# Patient Record
Sex: MALE | Race: WHITE | Employment: FULL TIME | URBAN - METROPOLITAN AREA
[De-identification: names, ages, dates, MRNs, and addresses within clinical notes are randomized per-mention and may not be internally consistent; named-entity substitution may affect disease eponyms.]

---

## 2022-06-02 ENCOUNTER — HOSPITAL ENCOUNTER (OUTPATIENT)
Age: 52
Setting detail: OBSERVATION
Discharge: HOME | End: 2022-06-04
Attending: EMERGENCY MEDICINE | Admitting: HOSPITALIST
Payer: COMMERCIAL

## 2022-06-02 DIAGNOSIS — R07.9 CHEST PAIN, UNSPECIFIED TYPE: Primary | ICD-10-CM

## 2022-06-02 PROCEDURE — 99285 EMERGENCY DEPT VISIT HI MDM: CPT

## 2022-06-02 RX ORDER — LOSARTAN POTASSIUM 100 MG/1
100 TABLET ORAL DAILY
COMMUNITY
End: 2022-07-22 | Stop reason: SDUPTHER

## 2022-06-02 RX ORDER — DOXYCYCLINE HYCLATE 100 MG/1
100 CAPSULE ORAL 2 TIMES DAILY
COMMUNITY

## 2022-06-02 RX ORDER — TRIAMTERENE AND HYDROCHLOROTHIAZIDE 37.5; 25 MG/1; MG/1
1 TABLET ORAL DAILY
Status: ON HOLD | COMMUNITY
End: 2022-06-04 | Stop reason: HOSPADM

## 2022-06-02 ASSESSMENT — PAIN SCALES - GENERAL: PAINLEVEL_OUTOF10: 3

## 2022-06-02 ASSESSMENT — PAIN - FUNCTIONAL ASSESSMENT: PAIN_FUNCTIONAL_ASSESSMENT: 0-10

## 2022-06-03 ENCOUNTER — APPOINTMENT (OUTPATIENT)
Dept: GENERAL RADIOLOGY | Age: 52
End: 2022-06-03
Payer: COMMERCIAL

## 2022-06-03 ENCOUNTER — APPOINTMENT (OUTPATIENT)
Dept: CT IMAGING | Age: 52
End: 2022-06-03
Payer: COMMERCIAL

## 2022-06-03 PROBLEM — R07.9 CHEST PAIN: Status: ACTIVE | Noted: 2022-06-03

## 2022-06-03 LAB
A/G RATIO: 2.1 (ref 1.1–2.2)
ALBUMIN SERPL-MCNC: 5 G/DL (ref 3.4–5)
ALP BLD-CCNC: 64 U/L (ref 40–129)
ALT SERPL-CCNC: 46 U/L (ref 10–40)
ANION GAP SERPL CALCULATED.3IONS-SCNC: 10 MMOL/L (ref 3–16)
AST SERPL-CCNC: 38 U/L (ref 15–37)
BASOPHILS ABSOLUTE: 0 K/UL (ref 0–0.2)
BASOPHILS RELATIVE PERCENT: 0.7 %
BILIRUB SERPL-MCNC: 0.5 MG/DL (ref 0–1)
BUN BLDV-MCNC: 15 MG/DL (ref 7–20)
CALCIUM SERPL-MCNC: 11.1 MG/DL (ref 8.3–10.6)
CHLORIDE BLD-SCNC: 98 MMOL/L (ref 99–110)
CO2: 28 MMOL/L (ref 21–32)
CREAT SERPL-MCNC: 0.8 MG/DL (ref 0.9–1.3)
EKG ATRIAL RATE: 71 BPM
EKG DIAGNOSIS: NORMAL
EKG P AXIS: 38 DEGREES
EKG P-R INTERVAL: 162 MS
EKG Q-T INTERVAL: 392 MS
EKG QRS DURATION: 90 MS
EKG QTC CALCULATION (BAZETT): 425 MS
EKG R AXIS: 62 DEGREES
EKG T AXIS: 36 DEGREES
EKG VENTRICULAR RATE: 71 BPM
EOSINOPHILS ABSOLUTE: 0.1 K/UL (ref 0–0.6)
EOSINOPHILS RELATIVE PERCENT: 1.4 %
GFR AFRICAN AMERICAN: >60
GFR NON-AFRICAN AMERICAN: >60
GLUCOSE BLD-MCNC: 134 MG/DL (ref 70–99)
HCT VFR BLD CALC: 45.6 % (ref 40.5–52.5)
HEMOGLOBIN: 15.7 G/DL (ref 13.5–17.5)
LEFT VENTRICULAR EJECTION FRACTION MODE: NORMAL
LV EF: 63 %
LV EF: 65 %
LVEF MODALITY: NORMAL
LYMPHOCYTES ABSOLUTE: 2.1 K/UL (ref 1–5.1)
LYMPHOCYTES RELATIVE PERCENT: 38.6 %
MAGNESIUM: 2.2 MG/DL (ref 1.8–2.4)
MCH RBC QN AUTO: 31.6 PG (ref 26–34)
MCHC RBC AUTO-ENTMCNC: 34.4 G/DL (ref 31–36)
MCV RBC AUTO: 91.7 FL (ref 80–100)
MONOCYTES ABSOLUTE: 0.6 K/UL (ref 0–1.3)
MONOCYTES RELATIVE PERCENT: 10.3 %
NEUTROPHILS ABSOLUTE: 2.7 K/UL (ref 1.7–7.7)
NEUTROPHILS RELATIVE PERCENT: 49 %
PARATHYROID HORMONE INTACT: 78.7 PG/ML (ref 14–72)
PDW BLD-RTO: 12.7 % (ref 12.4–15.4)
PLATELET # BLD: 228 K/UL (ref 135–450)
PMV BLD AUTO: 7.6 FL (ref 5–10.5)
POC ACT LR: 235 SEC
POTASSIUM REFLEX MAGNESIUM: 4.1 MMOL/L (ref 3.5–5.1)
RBC # BLD: 4.97 M/UL (ref 4.2–5.9)
SODIUM BLD-SCNC: 136 MMOL/L (ref 136–145)
TOTAL PROTEIN: 7.4 G/DL (ref 6.4–8.2)
TROPONIN: <0.01 NG/ML
TSH REFLEX FT4: 1 UIU/ML (ref 0.27–4.2)
VITAMIN D 25-HYDROXY: 27.4 NG/ML
WBC # BLD: 5.5 K/UL (ref 4–11)

## 2022-06-03 PROCEDURE — 2500000003 HC RX 250 WO HCPCS

## 2022-06-03 PROCEDURE — 93005 ELECTROCARDIOGRAM TRACING: CPT | Performed by: EMERGENCY MEDICINE

## 2022-06-03 PROCEDURE — 93017 CV STRESS TEST TRACING ONLY: CPT | Performed by: INTERNAL MEDICINE

## 2022-06-03 PROCEDURE — A9502 TC99M TETROFOSMIN: HCPCS | Performed by: NURSE PRACTITIONER

## 2022-06-03 PROCEDURE — 36415 COLL VENOUS BLD VENIPUNCTURE: CPT

## 2022-06-03 PROCEDURE — 2580000003 HC RX 258: Performed by: HOSPITALIST

## 2022-06-03 PROCEDURE — 2580000003 HC RX 258

## 2022-06-03 PROCEDURE — 92928 PRQ TCAT PLMT NTRAC ST 1 LES: CPT | Performed by: INTERNAL MEDICINE

## 2022-06-03 PROCEDURE — 6370000000 HC RX 637 (ALT 250 FOR IP): Performed by: INTERNAL MEDICINE

## 2022-06-03 PROCEDURE — 93010 ELECTROCARDIOGRAM REPORT: CPT | Performed by: INTERNAL MEDICINE

## 2022-06-03 PROCEDURE — 99205 OFFICE O/P NEW HI 60 MIN: CPT | Performed by: INTERNAL MEDICINE

## 2022-06-03 PROCEDURE — 93571 IV DOP VEL&/PRESS C FLO 1ST: CPT | Performed by: INTERNAL MEDICINE

## 2022-06-03 PROCEDURE — C1874 STENT, COATED/COV W/DEL SYS: HCPCS

## 2022-06-03 PROCEDURE — 80053 COMPREHEN METABOLIC PANEL: CPT

## 2022-06-03 PROCEDURE — 84443 ASSAY THYROID STIM HORMONE: CPT

## 2022-06-03 PROCEDURE — 75574 CT ANGIO HRT W/3D IMAGE: CPT

## 2022-06-03 PROCEDURE — 2500000003 HC RX 250 WO HCPCS: Performed by: INTERNAL MEDICINE

## 2022-06-03 PROCEDURE — 3430000000 HC RX DIAGNOSTIC RADIOPHARMACEUTICAL: Performed by: NURSE PRACTITIONER

## 2022-06-03 PROCEDURE — 6370000000 HC RX 637 (ALT 250 FOR IP): Performed by: HOSPITALIST

## 2022-06-03 PROCEDURE — 99153 MOD SED SAME PHYS/QHP EA: CPT

## 2022-06-03 PROCEDURE — C9600 PERC DRUG-EL COR STENT SING: HCPCS

## 2022-06-03 PROCEDURE — 6370000000 HC RX 637 (ALT 250 FOR IP): Performed by: EMERGENCY MEDICINE

## 2022-06-03 PROCEDURE — 99152 MOD SED SAME PHYS/QHP 5/>YRS: CPT

## 2022-06-03 PROCEDURE — 99152 MOD SED SAME PHYS/QHP 5/>YRS: CPT | Performed by: INTERNAL MEDICINE

## 2022-06-03 PROCEDURE — 82306 VITAMIN D 25 HYDROXY: CPT

## 2022-06-03 PROCEDURE — 78452 HT MUSCLE IMAGE SPECT MULT: CPT | Performed by: INTERNAL MEDICINE

## 2022-06-03 PROCEDURE — 93458 L HRT ARTERY/VENTRICLE ANGIO: CPT

## 2022-06-03 PROCEDURE — C1769 GUIDE WIRE: HCPCS

## 2022-06-03 PROCEDURE — 6360000002 HC RX W HCPCS

## 2022-06-03 PROCEDURE — 83735 ASSAY OF MAGNESIUM: CPT

## 2022-06-03 PROCEDURE — C1725 CATH, TRANSLUMIN NON-LASER: HCPCS

## 2022-06-03 PROCEDURE — 6360000004 HC RX CONTRAST MEDICATION: Performed by: INTERNAL MEDICINE

## 2022-06-03 PROCEDURE — 93005 ELECTROCARDIOGRAM TRACING: CPT | Performed by: INTERNAL MEDICINE

## 2022-06-03 PROCEDURE — G0378 HOSPITAL OBSERVATION PER HR: HCPCS

## 2022-06-03 PROCEDURE — C1894 INTRO/SHEATH, NON-LASER: HCPCS

## 2022-06-03 PROCEDURE — 84484 ASSAY OF TROPONIN QUANT: CPT

## 2022-06-03 PROCEDURE — C1887 CATHETER, GUIDING: HCPCS

## 2022-06-03 PROCEDURE — 85347 COAGULATION TIME ACTIVATED: CPT

## 2022-06-03 PROCEDURE — 85025 COMPLETE CBC W/AUTO DIFF WBC: CPT

## 2022-06-03 PROCEDURE — 96374 THER/PROPH/DIAG INJ IV PUSH: CPT

## 2022-06-03 PROCEDURE — 6370000000 HC RX 637 (ALT 250 FOR IP)

## 2022-06-03 PROCEDURE — 83970 ASSAY OF PARATHORMONE: CPT

## 2022-06-03 PROCEDURE — 2709999900 HC NON-CHARGEABLE SUPPLY

## 2022-06-03 PROCEDURE — 71045 X-RAY EXAM CHEST 1 VIEW: CPT

## 2022-06-03 PROCEDURE — 93458 L HRT ARTERY/VENTRICLE ANGIO: CPT | Performed by: INTERNAL MEDICINE

## 2022-06-03 PROCEDURE — 93571 IV DOP VEL&/PRESS C FLO 1ST: CPT

## 2022-06-03 RX ORDER — ONDANSETRON 4 MG/1
4 TABLET, ORALLY DISINTEGRATING ORAL EVERY 8 HOURS PRN
Status: DISCONTINUED | OUTPATIENT
Start: 2022-06-03 | End: 2022-06-04 | Stop reason: HOSPADM

## 2022-06-03 RX ORDER — ASPIRIN 81 MG/1
324 TABLET, CHEWABLE ORAL ONCE
Status: COMPLETED | OUTPATIENT
Start: 2022-06-03 | End: 2022-06-03

## 2022-06-03 RX ORDER — POLYETHYLENE GLYCOL 3350 17 G/17G
17 POWDER, FOR SOLUTION ORAL DAILY PRN
Status: DISCONTINUED | OUTPATIENT
Start: 2022-06-03 | End: 2022-06-04 | Stop reason: HOSPADM

## 2022-06-03 RX ORDER — METOPROLOL TARTRATE 5 MG/5ML
5 INJECTION INTRAVENOUS EVERY 5 MIN PRN
Status: DISCONTINUED | OUTPATIENT
Start: 2022-06-03 | End: 2022-06-04 | Stop reason: HOSPADM

## 2022-06-03 RX ORDER — ACETAMINOPHEN 650 MG/1
650 SUPPOSITORY RECTAL EVERY 6 HOURS PRN
Status: DISCONTINUED | OUTPATIENT
Start: 2022-06-03 | End: 2022-06-04 | Stop reason: HOSPADM

## 2022-06-03 RX ORDER — SODIUM CHLORIDE 9 MG/ML
INJECTION, SOLUTION INTRAVENOUS PRN
Status: DISCONTINUED | OUTPATIENT
Start: 2022-06-03 | End: 2022-06-04 | Stop reason: HOSPADM

## 2022-06-03 RX ORDER — MAGNESIUM HYDROXIDE/ALUMINUM HYDROXICE/SIMETHICONE 120; 1200; 1200 MG/30ML; MG/30ML; MG/30ML
30 SUSPENSION ORAL EVERY 6 HOURS PRN
Status: DISCONTINUED | OUTPATIENT
Start: 2022-06-03 | End: 2022-06-04 | Stop reason: HOSPADM

## 2022-06-03 RX ORDER — ROSUVASTATIN CALCIUM 20 MG/1
20 TABLET, COATED ORAL DAILY
Status: DISCONTINUED | OUTPATIENT
Start: 2022-06-03 | End: 2022-06-04 | Stop reason: HOSPADM

## 2022-06-03 RX ORDER — ACETAMINOPHEN 325 MG/1
650 TABLET ORAL EVERY 6 HOURS PRN
Status: DISCONTINUED | OUTPATIENT
Start: 2022-06-03 | End: 2022-06-04 | Stop reason: HOSPADM

## 2022-06-03 RX ORDER — NITROGLYCERIN 0.4 MG/1
0.4 TABLET SUBLINGUAL EVERY 5 MIN PRN
Status: DISCONTINUED | OUTPATIENT
Start: 2022-06-03 | End: 2022-06-04 | Stop reason: HOSPADM

## 2022-06-03 RX ORDER — CARVEDILOL 6.25 MG/1
6.25 TABLET ORAL 2 TIMES DAILY WITH MEALS
Status: DISCONTINUED | OUTPATIENT
Start: 2022-06-03 | End: 2022-06-03

## 2022-06-03 RX ORDER — EPTIFIBATIDE 0.75 MG/ML
2 INJECTION, SOLUTION INTRAVENOUS CONTINUOUS
Status: ACTIVE | OUTPATIENT
Start: 2022-06-03 | End: 2022-06-03

## 2022-06-03 RX ORDER — SODIUM CHLORIDE 0.9 % (FLUSH) 0.9 %
5-40 SYRINGE (ML) INJECTION PRN
Status: DISCONTINUED | OUTPATIENT
Start: 2022-06-03 | End: 2022-06-04 | Stop reason: HOSPADM

## 2022-06-03 RX ORDER — SODIUM CHLORIDE 9 MG/ML
INJECTION, SOLUTION INTRAVENOUS CONTINUOUS
Status: DISCONTINUED | OUTPATIENT
Start: 2022-06-03 | End: 2022-06-04 | Stop reason: HOSPADM

## 2022-06-03 RX ORDER — ENOXAPARIN SODIUM 100 MG/ML
40 INJECTION SUBCUTANEOUS DAILY
Status: DISCONTINUED | OUTPATIENT
Start: 2022-06-03 | End: 2022-06-04 | Stop reason: HOSPADM

## 2022-06-03 RX ORDER — CARVEDILOL 6.25 MG/1
12.5 TABLET ORAL 2 TIMES DAILY WITH MEALS
Status: DISCONTINUED | OUTPATIENT
Start: 2022-06-03 | End: 2022-06-04 | Stop reason: HOSPADM

## 2022-06-03 RX ORDER — LOSARTAN POTASSIUM 100 MG/1
100 TABLET ORAL DAILY
Status: DISCONTINUED | OUTPATIENT
Start: 2022-06-03 | End: 2022-06-04 | Stop reason: HOSPADM

## 2022-06-03 RX ORDER — ONDANSETRON 2 MG/ML
4 INJECTION INTRAMUSCULAR; INTRAVENOUS EVERY 6 HOURS PRN
Status: DISCONTINUED | OUTPATIENT
Start: 2022-06-03 | End: 2022-06-04 | Stop reason: HOSPADM

## 2022-06-03 RX ORDER — CLOPIDOGREL BISULFATE 75 MG/1
75 TABLET ORAL DAILY
Status: DISCONTINUED | OUTPATIENT
Start: 2022-06-03 | End: 2022-06-04 | Stop reason: HOSPADM

## 2022-06-03 RX ORDER — ASPIRIN 81 MG/1
81 TABLET, CHEWABLE ORAL DAILY
Status: DISCONTINUED | OUTPATIENT
Start: 2022-06-04 | End: 2022-06-04 | Stop reason: HOSPADM

## 2022-06-03 RX ORDER — TRIAMTERENE AND HYDROCHLOROTHIAZIDE 37.5; 25 MG/1; MG/1
1 TABLET ORAL DAILY
Status: DISCONTINUED | OUTPATIENT
Start: 2022-06-03 | End: 2022-06-03

## 2022-06-03 RX ORDER — SODIUM CHLORIDE 0.9 % (FLUSH) 0.9 %
5-40 SYRINGE (ML) INJECTION EVERY 12 HOURS SCHEDULED
Status: DISCONTINUED | OUTPATIENT
Start: 2022-06-03 | End: 2022-06-04 | Stop reason: HOSPADM

## 2022-06-03 RX ADMIN — ASPIRIN 324 MG: 81 TABLET, CHEWABLE ORAL at 01:17

## 2022-06-03 RX ADMIN — SODIUM CHLORIDE: 9 INJECTION, SOLUTION INTRAVENOUS at 18:08

## 2022-06-03 RX ADMIN — LOSARTAN POTASSIUM 100 MG: 100 TABLET, FILM COATED ORAL at 11:33

## 2022-06-03 RX ADMIN — CLOPIDOGREL BISULFATE 75 MG: 75 TABLET ORAL at 20:06

## 2022-06-03 RX ADMIN — TETROFOSMIN 10 MILLICURIE: 1.38 INJECTION, POWDER, LYOPHILIZED, FOR SOLUTION INTRAVENOUS at 07:40

## 2022-06-03 RX ADMIN — Medication 10 ML: at 11:34

## 2022-06-03 RX ADMIN — METOPROLOL TARTRATE 5 MG: 1 INJECTION, SOLUTION INTRAVENOUS at 14:15

## 2022-06-03 RX ADMIN — CARVEDILOL 12.5 MG: 6.25 TABLET, FILM COATED ORAL at 20:05

## 2022-06-03 RX ADMIN — IOPAMIDOL 100 ML: 755 INJECTION, SOLUTION INTRAVENOUS at 14:32

## 2022-06-03 RX ADMIN — TETROFOSMIN 30 MILLICURIE: 1.38 INJECTION, POWDER, LYOPHILIZED, FOR SOLUTION INTRAVENOUS at 09:28

## 2022-06-03 RX ADMIN — ROSUVASTATIN 20 MG: 20 TABLET, FILM COATED ORAL at 11:33

## 2022-06-03 RX ADMIN — SODIUM CHLORIDE: 9 INJECTION, SOLUTION INTRAVENOUS at 18:23

## 2022-06-03 RX ADMIN — IOPAMIDOL 99 ML: 755 INJECTION, SOLUTION INTRAVENOUS at 17:33

## 2022-06-03 ASSESSMENT — PAIN SCALES - GENERAL
PAINLEVEL_OUTOF10: 0

## 2022-06-03 ASSESSMENT — HEART SCORE: ECG: 0

## 2022-06-03 NOTE — PRE SEDATION
Brief Pre-Op Note/Sedation Assessment      Cherelle Peter  1970  [id number]  3:30 PM    Planned Procedure: Cardiac Catheterization Procedure  Post Procedure Plan: Return to same level of care  Consent: I have discussed with the patient and/or the patient representative the indication, alternatives, and the possible risks and/or complications of the planned procedure and the anesthesia methods. The patient and/or patient representative appear to understand and agree to proceed. Chief Complaint:   Chest Pain/Pressure  Anginal Equivalent  Dyspnea on Exertion  Dyspnea    Indications for Cath Procedure:   Presentation:  New Onset Angina <= 2 months, Worsening Angina and Suspected CAD   Anginal Classification within 2 weeks:  CCS III - Symptoms with everyday living activities, i.e., moderate limitation   Angina Symptoms Assessment:  Typical Chest Pain   Heart Failure Class within last 2 weeks:  No symptoms   Cardiovascular Instability:  Yes    Prior Ischemic Workup/Eval:   Pre-Procedural Medications: Yes: ACE/ARB/ARNI, Aspirin, Beta Blockers and STATIN   Stress Test Completed? Yes:  Stress or Imaging Studies Performed (within ANY time period):   Type:  Stress Nuclear  Results:  Positive:  Myocardial Perfusion Defects (Nuclear) Extent of Ischemia:  Intermediate    Does Patient need surgery?  Cath Valve Surgery:  No    Pre-Procedure Medical History:   Vital Signs:  /71   Pulse 75   Temp 98.8 °F (37.1 °C) (Oral)   Resp 17   Ht 5' 8\" (1.727 m)   Wt 246 lb 3.2 oz (111.7 kg)   SpO2 96%   BMI 37.43 kg/m²    Allergies:  Reviewed in EMR   Medications:  Reviewed in EMR   Past Medical History:  Reviewed in EMR   Surgical History:  Reviewed in EMR    Pre-Sedation:   Pre-Sedation Documentation and Exam = I have assessed the patient and reviewed the H&P on the chart.    Prior History of Anesthesia Complications = none   Modified Mallampati = II (soft palate, uvula, fauces visible)   ASA Classification = Class 2 - A normal healthy patient with mild systemic disease    Greg Scale:   Activity:  2 - Able to move 4 extremities voluntarily on command   Respiration:  2 - Able to breathe deeply and cough freely   Circulation:  2 - BP+/- 20mmHg of normal   Consciousness:  2 - Fully awake   Oxygen Saturation (color):  2 - Able to maintain oxygen saturation >92% on room air    Sedation/Anesthesia Plan:  Guard the patient's safety and welfare. Minimize physical discomfort and pain. Minimize negative psychological responses to treatment by providing sedation and analgesia and maximize the potential amnesia. Patient to meet pre-procedure discharge plan.     Medication Planned:  Midazolam intravenously and fentanyl intravenously    Patient is an appropriate candidate for plan of sedation:   Yes      Electronically signed by Bong Sorensen MD on 6/3/2022 at 3:30 PM

## 2022-06-03 NOTE — CONSULTS
[facility]  [phone]      Chief Complaint   Patient presents with    Chest Pain     pt via EMS from home. Pt c/o burning in his chest, relieved by rolaid at 8. Has rash to back of neck, was given antibiotics that he did not finish. Reason for consult:  Abnormal stress test    ASSESSMENT AND PLAN:    1. Chest pain of uncertain etiology  2. Abnormal nuclear perfusion on stress test with normal ECG and normal exercise capacity  3. Hypercholesterolemia  4. 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 Hypertension    Plan  1. Although his stress test is technically abnormal, given how mild his symptoms are now, and how he had no symptoms on treadmill testing with normal exercise tolerance, there is a good chance that either the stress test is a false-positive or the patient has low-risk coronary disease    2. Will do coronary CTA to r/o left main or proximal LAD disease    3. If CTA shows no coronary disease patient can go home    4. If there is significant CAD that may require stenting or CABG will reassess patient and potentially keep for cath Monday - however most likely will be able to discharge and arrange urgent outpatient f/u    5. I put in metoprolol IV orders for HR control for CT    6. Will low-dose carvedilol for HR and BP control    7. Nephrology consult for management of hypercalcemia    Further recs pending results of coronary CTA    History of Present Illness:  Sha Glez is a 47 y.o. patient who presented to the hospital with complaints of chest pain and exertional dyspnea. I have been asked to provide consultation regarding further management and testing. Patient is a previously healthy 47year old with no chronic health problems other than mild hypercalcemia from a small parathytoid adenoma, who presents with about a week of dyspnea on exertion while mowing the grass.   He didn't think much of his symptoms until last night when he suddenly woke from sleeping with severe chest pain. He eats a lot of spicy food. He took Rolaids, and the pain abated, but came back about 30 minutes later so he came to ED. He has well-treated hypercholesterolemia and HTN and otherwise no known CAD risk factors. Lifelong nonsmoker/nondrinker. No diabetes. No concerning FH of premature CAD. He has been on Crestor for a long time. Past Medical History:   has a past medical history of Hypertension. Surgical History:   has no past surgical history on file. Social History:   reports that he has never smoked. He has never used smokeless tobacco.     Family History:  No family history of premature coronary artery disease, aortic disease, or valve disease. Home Medications:  Were reviewed and are listed in nursing record. and/or listed below  Prior to Admission medications    Medication Sig Start Date End Date Taking?  Authorizing Provider   losartan (COZAAR) 100 MG tablet Take 100 mg by mouth daily   Yes Historical Provider, MD   triamterene-hydroCHLOROthiazide (MAXZIDE-25) 37.5-25 MG per tablet Take 1 tablet by mouth daily   Yes Historical Provider, MD   Rosuvastatin Calcium 20 MG CPSP Take 20 mg by mouth daily   Yes Historical Provider, MD   doxycycline hyclate (VIBRAMYCIN) 100 mg capsule Take 100 mg by mouth 2 times daily   Yes Historical Provider, MD        Current Medications:  Current Facility-Administered Medications   Medication Dose Route Frequency Provider Last Rate Last Admin    losartan (COZAAR) tablet 100 mg  100 mg Oral Daily Anselmo Beth MD   100 mg at 06/03/22 1133    rosuvastatin (CRESTOR) tablet 20 mg  20 mg Oral Daily Anselmo Beth MD   20 mg at 06/03/22 1133    triamterene-hydroCHLOROthiazide (MAXZIDE-25) 37.5-25 MG per tablet 1 tablet  1 tablet Oral Daily Anselmo Beth MD        sodium chloride flush 0.9 % injection 5-40 mL  5-40 mL IntraVENous 2 times per day Wade Lazo MD   10 mL at 06/03/22 1134    sodium chloride flush 0.9 % injection 5-40 mL  5-40 mL IntraVENous PRN Anselmo Beth MD        0.9 % sodium chloride infusion   IntraVENous PRN Jeison Johnson MD        ondansetron (ZOFRAN-ODT) disintegrating tablet 4 mg  4 mg Oral Q8H PRN Jeison Johnson MD        Or    ondansetron (ZOFRAN) injection 4 mg  4 mg IntraVENous Q6H PRN Jeison Johnson MD        acetaminophen (TYLENOL) tablet 650 mg  650 mg Oral Q6H PRN Jeison Johnson MD        Or    acetaminophen (TYLENOL) suppository 650 mg  650 mg Rectal Q6H PRN Jeison Johnson MD        polyethylene glycol (GLYCOLAX) packet 17 g  17 g Oral Daily PRN Jeison Johnson MD        [START ON 6/4/2022] aspirin chewable tablet 81 mg  81 mg Oral Daily Anselmo Beth MD        aluminum & magnesium hydroxide-simethicone (MAALOX) 200-200-20 MG/5ML suspension 30 mL  30 mL Oral Q6H PRN Anselmo Beth MD        enoxaparin (LOVENOX) injection 40 mg  40 mg SubCUTAneous Daily Anselmo Beth MD        nitroGLYCERIN (NITROSTAT) SL tablet 0.4 mg  0.4 mg SubLINGual Q5 Min PRN Anselmo Beth MD        0.9 % sodium chloride infusion   IntraVENous Continuous Jeison Johnson MD            Allergies:  Patient has no known allergies. Review of Systems:     All systems reviewed and negative except as stated above. Physical Examination:    Vitals:    06/03/22 0504   BP: (!) 147/91   Pulse: 67   Resp: 17   Temp: 98 °F (36.7 °C)   SpO2: 97%    Weight: 246 lb 3.2 oz (111.7 kg)       Body mass index is 37.43 kg/m².     General Appearance:  Alert, cooperative, no distress, appears stated age   Head:  Normocephalic, without obvious abnormality, atraumatic   Eyes:  PERRL, conjunctiva/corneas clear   Nose: Nares normal, no drainage or sinus tenderness   Throat: Lips, mucosa, and tongue normal   Neck: Supple, symmetrical, trachea midline, no adenopathy, thyroid: not enlarged, symmetric, no tenderness/mass/nodules, no carotid bruit or JVD   Lungs:   Clear to auscultation bilaterally, respirations unlabored   Chest Wall:  No tenderness or deformity   Heart:  Regular rate and rhythm, S1, S2 normal, no murmur, rub or gallop   Abdomen:   Soft, non-tender, bowel sounds active all four quadrants,  no masses, no organomegaly   Extremities: Extremities normal, atraumatic, no cyanosis or edema   Pulses: 2+ and symmetric   Skin: Skin color, texture, turgor normal, no rashes or lesions   Psych: Normal mood and affect   Neurologic: CN II-XII grossly intact        Labs  No results found for: PROBNP      No results found for: CHOL, TRIG, HDL, LDLCALC, LABVLDL      Troponin   Date/Time Value Ref Range Status   06/03/2022 10:34 AM <0.01 <0.01 ng/mL Final     Comment:     Methodology by Troponin T   06/03/2022 05:13 AM <0.01 <0.01 ng/mL Final     Comment:     Methodology by Troponin T   06/03/2022 12:22 AM <0.01 <0.01 ng/mL Final     Comment:     Methodology by Troponin T           CBC:   Lab Results   Component Value Date    WBC 5.5 06/03/2022    RBC 4.97 06/03/2022    HGB 15.7 06/03/2022    HCT 45.6 06/03/2022    MCV 91.7 06/03/2022    RDW 12.7 06/03/2022     06/03/2022     CMP:    Lab Results   Component Value Date     06/03/2022    K 4.1 06/03/2022    CL 98 06/03/2022    CO2 28 06/03/2022    BUN 15 06/03/2022    CREATININE 0.8 06/03/2022    GFRAA >60 06/03/2022    AGRATIO 2.1 06/03/2022    LABGLOM >60 06/03/2022    GLUCOSE 134 06/03/2022    PROT 7.4 06/03/2022    CALCIUM 11.1 06/03/2022    BILITOT 0.5 06/03/2022    ALKPHOS 64 06/03/2022    AST 38 06/03/2022    ALT 46 06/03/2022     PT/INR:  No results found for: PTINR  Lab Results   Component Value Date    TROPONINI <0.01 06/03/2022       EKG:  I have reviewed EKG with the following interpretation:  Impression:    Normal sinus rhythm  Normal ECG    CXR - clear lungs    Echo: none prior    Stress: There is a small-moderate inferior lateral reversible defect c/w ischemia.   LV function is normal with no regional abnormalities and EF=63%  Intermediate risk study. Cath: no prior    Old notes reviewed  Telemetry reviewd  Ekg personally reviewed  Chest xray personally reviewed  Echo, stress, cath, and/or other cardiac testing reviewed in detail   Medications and labs reviewed    High complexity/medical decision making due to extensive data review, extensive history review, independent review of data    High risk due to acute illness, evaluation of drug-drug interactions, medication management and diagnostic interventions    I will address the patient's cardiac risk factors and adjusted pharmacologic treatment as needed. In addition, I have reinforced the need for patient directed risk factor modification. Tobacco use was discussed with the patient and educated on the negative effects. I have asked the patient to not utilize these agents. Thank you for allowing to us to participate in the care or Sivakumar Lawson. Further evaluation will be based upon the patient's clinical course and testing results. All questions and concerns were addressed to the patient/family. Alternatives to my treatment were discussed. The note was completed using EMR. Every effort was made to ensure accuracy; however, inadvertent computerized transcription errors may be present.       Guillermo Lynch MD, MD 6/3/2022 11:50 AM

## 2022-06-03 NOTE — H&P
_    [facility] HOSPITALIST HISTORY AND PHYSICAL/CONSULT    6/3/2022 2:26 AM    Patient Information:  Brayan aDwn is a 47 y.o. male [id number]    PCP:  No primary care provider on file. (Tel: None )    Date of Service:   Pt seen/examined on 6/3/2022    Placed in Observation. Chief complaint:    Chief Complaint   Patient presents with    Chest Pain     pt via EMS from home. Pt c/o burning in his chest, relieved by rolaid at 8. Has rash to back of neck, was given antibiotics that he did not finish. History of Present Illness:  Cherelle Peter is a 47 y.o. male who presented with   ·  he comes from home . He came home from work in the evening . Since then he felt some substernal and left sided chest pain , not related to exertion or so . He ate his dinner and took some Rolaid @ Home thinking it was 2/2 heartburn or so . His chest pain s/s did zahraa for the time being . He went to bed . Around [id number] , he started having recurrence of his chest pains and left sided chest discomfort . He could not get comfortable lying in bed . Hence he called 911 and was BIBA to ED . His s/s did improve en route to ED   · Of note , he also took some Doxycycline X 5-7 days @ Home for an insect bite that happened in his posterior neck region     History and pertinent information obtained from   · ED provider   · Prior Chart    · Patient        Vitals:    06/02/22 2353 06/02/22 2355   BP:  (!) 153/96   Pulse: 81    Temp: 98.5 °F (36.9 °C)    TempSrc: Oral    Weight: 245 lb (111.1 kg)    Height: 5' 8\" (1.727 m)           · Imaging/Labs/Work up/Medications given/Consults called by ED => Reviewed and Noted        No current facility-administered medications for this encounter. On my evaluation, patient's condition as below :   · Since arrival to ED, post above Rx, patient is AO X 3   · Now chest pain free   · No SOB   · No N/V   ·        REVIEW OF SYSTEMS:      Pertinent positives and negatives are noted in the HPI . All other systems were reviewed and are negative. Past Medical History:         has a past medical history of Hypertension. Past Surgical History:         has no past surgical history on file. Medications:    Current Outpatient Medications on File Prior to Encounter   Medication Sig Dispense Refill    losartan (COZAAR) 100 MG tablet Take 100 mg by mouth daily      triamterene-hydroCHLOROthiazide (MAXZIDE-25) 37.5-25 MG per tablet Take 1 tablet by mouth daily      Rosuvastatin Calcium 20 MG CPSP Take 20 mg by mouth daily      doxycycline hyclate (VIBRAMYCIN) 100 mg capsule Take 100 mg by mouth 2 times daily           Allergies:  Not on File       Social History:          Family History:      Father : CVA ; AFIB , dementia       Physical Exam:  BP (!) 153/96   Pulse 81   Temp 98.5 °F (36.9 °C) (Oral)   Ht 5' 8\" (1.727 m)   Wt 245 lb (111.1 kg)   BMI 37.25 kg/m²     General appearance:  Appears comfortable. Well nourished   Eyes:  Sclera clear, pupils equal  ENT:  Moist mucus membranes, Trachea midline. Cardiovascular: Regular rhythm, normal S1, S2. No edema in lower extremities   Respiratory:   Noted Clear to auscultation bilaterally,  No wheeze, good inspiratory effort   Gastrointestinal:  Abdomen soft,  non-tender,  not distended,   Musculoskeletal:  No cyanosis in digits, neck supple  Neurology:  Cranial nerves grossly intact. Alert and oriented in time, place and person. No speech or motor deficits   Psychiatry:  Appropriate affect. Not agitated  Skin:  Warm, dry, normal turgor, no rash       Labs:     Recent Labs     06/03/22 0022   WBC 5.5   HGB 15.7   HCT 45.6        Recent Labs     06/03/22 0022      K 4.1   CL 98*   CO2 28   BUN 15   CREATININE 0.8*   CALCIUM 11.1*     Recent Labs     06/03/22 0022   AST 38*   ALT 46*   BILITOT 0.5   ALKPHOS 64     No results for input(s): INR in the last 72 hours.   Recent Labs     06/03/22 0022   TROPONINI <0.01 Urinalysis:    No results found for: [place] er, BACTERIA, [address] [facility], [place], [address], [place] [address]      Radiology:     CXR:   I have reviewed the CXR  No acute findings or is unremarkable for any acute pathology needing acute interventions, on review. EKG/Telemonitor :    I have reviewed the EKG  NSR     IMAGING :     XR CHEST PORTABLE   Final Result   No acute cardiopulmonary process. PROBLEM LIST [ ACTIVE + CHRONIC ]     Active Hospital Problems    Diagnosis Date Noted    Chest pain [R07.9] 06/03/2022     Priority: Medium         ACUTE/CHRONIC ISSUES      Chest pain and SOB , r/o ACS , POA    Mild hypercalcemia POA      HTN Hx    HLD Hx     PERTINENT PLAN OF CARE       Will continue cardiac monitor while inpatient. Admission EKG reviewed . EKG prn chest pain. Cardiac enzymes on admission in ED noted per Labs and will trend cardiac enzymes further while inpatient. => Medication Plan for now will be as follows . Started ASA QD  + NTG Prn  .    Planned NM stress test in AM w/ Exercise    Resumed his home medications    Gentle IVF . Checks TFTs, PTH, Vit D levels for Hypercalcemia evaluation       · Medication received in ED and workup in ED so far Noted and reviewed as above. · Home medications for chronic medical problems Reviewed and Held/Resumed/Changes made, as clinically warranted/Indicated. · Please See EPIC Order for detailed plans of care and further Details. · DVT Prophylaxis . Is on Lovenox   ; + SCDs   · Nutrition/Diet. No diet orders on file  · Code Status/Advanced Care Plan . No Order  · PT/OT and ambulatory Eval Status. Ambulate with Assist    · Probable LOS & future Disposition planned post discharge .  Home in 1-2 days       CONSULTS ORDERED @ ADMISSION   None      Medical Decision Making : HIGH     Total patient are [ Direct and Indirect ] time spent in evaluating the patient an discussing plan with appropriate staff/patient/family members is approximately ~ 50 min       Nito Estevez MD    Hospitalist, [facility].    6/3/2022 2:55 AM

## 2022-06-03 NOTE — PROGRESS NOTES
[facility]    6/3/2022 11:52 AM        Name: Ruby Vyas . Admitted: 6/2/2022  Primary Care Provider: No primary care provider on file. (Tel: None)    Chief Complaint   Patient presents with    Chest Pain     pt via EMS from home. Pt c/o burning in his chest, relieved by rolaid at 8. Has rash to back of neck, was given antibiotics that he did not finish. Brief History: Patient is a 48 yo male with hx HTN. He presented to ER with c/o left sided chest pain. He was admitted for further evaluation. Subjective:  Presently resting in bed. States he feels fine, offers no complaints. Denies any chest pain, shortness of breath, palpitations, abdominal pain, nausea. Reviewed stress test results showing small to moderate inferior lateral reversible defect consistent with ischemia. Aware cardiology consulted.      Reviewed interval ancillary notes    Current Medications  losartan (COZAAR) tablet 100 mg, Daily  rosuvastatin (CRESTOR) tablet 20 mg, Daily  triamterene-hydroCHLOROthiazide (MAXZIDE-25) 37.5-25 MG per tablet 1 tablet, Daily  sodium chloride flush 0.9 % injection 5-40 mL, 2 times per day  sodium chloride flush 0.9 % injection 5-40 mL, PRN  0.9 % sodium chloride infusion, PRN  ondansetron (ZOFRAN-ODT) disintegrating tablet 4 mg, Q8H PRN   Or  ondansetron (ZOFRAN) injection 4 mg, Q6H PRN  acetaminophen (TYLENOL) tablet 650 mg, Q6H PRN   Or  acetaminophen (TYLENOL) suppository 650 mg, Q6H PRN  polyethylene glycol (GLYCOLAX) packet 17 g, Daily PRN  [START ON 6/4/2022] aspirin chewable tablet 81 mg, Daily  aluminum & magnesium hydroxide-simethicone (MAALOX) 200-200-20 MG/5ML suspension 30 mL, Q6H PRN  enoxaparin (LOVENOX) injection 40 mg, Daily  nitroGLYCERIN (NITROSTAT) SL tablet 0.4 mg, Q5 Min PRN  0.9 % sodium chloride infusion, Continuous        Objective:  BP (!) 147/91   Pulse 67   Temp 98 losartan. Disposition: Anticipate home no needs. Patient with abnormal stress test. Await cardiac recs.       Diet: Diet NPO Exceptions are: Sips of Water with Meds, Ice Chips  Diet NPO Exceptions are: Sips of Water with Meds, Ice Chips  Code:Full Code  DVT PPX: enoxaparin      CLARICE Gtz CNP   6/3/2022 11:52 AM

## 2022-06-03 NOTE — PROGRESS NOTES
Pt admitted to room 3303. VSS. Pt A&Ox4. Went over [address] with pt. Pt v/u. Oriented pt to room and call light. Instructed pt to call out with any needs. Bed side table and call light within reach. Will monitor.

## 2022-06-03 NOTE — ED PROVIDER NOTES
[address] [facility]  eMERGENCY dEPARTMENT eNCOUnter        Pt Name: Darby Sarmiento  MRN: [id number]  Birthdate 1970  Date of evaluation: 6/2/2022  Provider: Nusrat Mann MD  PCP: No primary care provider on file. CHIEF COMPLAINT       Chief Complaint   Patient presents with    Chest Pain     pt via EMS from home. Pt c/o burning in his chest, relieved by rolaid at 8. Has rash to back of neck, was given antibiotics that he did not finish. HISTORY OFPRESENT ILLNESS   (Location/Symptom, Timing/Onset, Context/Setting, Quality, Duration, Modifying Factors,Severity)  Note limiting factors. Darby Sarmiento is a 47 y.o. male she has had chest pressure in the precordial region off and on for the past week while mowing the grass last week and then off and on today at rest it causes him to be short of breath it is in the precordial region sometimes to the left arm patient has history of hypertension hypercholesterolemia and obesity patient has been taking doxycycline for a infection on his neck and stopped it over the last 24 hours but continues to have the burning pain in his chest    Nursing Notes were all reviewed and agreed with or any disagreements were addressed  in the HPI. REVIEW OF SYSTEMS    (2-9 systems for level 4, 10 or more for level 5)       REVIEW OF SYSTEMS    Constitutional:  Denies fever, chills, or weakness   Eyes:  Denies vision changes  HENT:  Denies sore throat or neck pain   Respiratory:  Denies cough some shortness of breath   Cardiovascular:   chest pain  GI:  Denies abdominal pain, nausea, vomiting, or diarrhea   Musculoskeletal:  Denies back pain   Skin: no rash or vesicles   Neurologic:  no headache weakness focal    Lymphatic:  no swollen  nodes   Psychiatric: no si or hs thoughts     All systems negative except as marked. Positives and Pertinent negatives as per HPI.   Except as noted above in the ROS, all other systems were reviewed andnegative. PASTMEDICAL HISTORY     Past Medical History:   Diagnosis Date    Hypertension          SURGICAL HISTORY     No past surgical history on file. CURRENT MEDICATIONS       Previous Medications    DOXYCYCLINE HYCLATE (VIBRAMYCIN) 100 MG CAPSULE    Take 100 mg by mouth 2 times daily    LOSARTAN (COZAAR) 100 MG TABLET    Take 100 mg by mouth daily    ROSUVASTATIN CALCIUM 20 MG CPSP    Take 20 mg by mouth daily    TRIAMTERENE-HYDROCHLOROTHIAZIDE (MAXZIDE-25) 37.5-25 MG PER TABLET    Take 1 tablet by mouth daily       ALLERGIES     Patient has no allergy information on record. FAMILY HISTORY     No family history on file. SOCIAL HISTORY       Social History     Socioeconomic History    Marital status:      Spouse name: Not on file    Number of children: Not on file    Years of education: Not on file    Highest education level: Not on file   Occupational History    Not on file   Tobacco Use    Smoking status: Not on file    Smokeless tobacco: Not on file   Substance and Sexual Activity    Alcohol use: Not on file    Drug use: Not on file    Sexual activity: Not on file   Other Topics Concern    Not on file   Social History Narrative    Not on file     Social Determinants of Health     Financial Resource Strain:     Difficulty of Paying Living Expenses: Not on file   Food Insecurity:     Worried About Running Out of Food in the Last Year: Not on file    Ambar of Food in the Last Year: Not on file   Transportation Needs:     Lack of Transportation (Medical): Not on file    Lack of Transportation (Non-Medical):  Not on file   Physical Activity:     Days of Exercise per Week: Not on file    Minutes of Exercise per Session: Not on file   Stress:     Feeling of Stress : Not on file   Social Connections:     Frequency of Communication with Friends and Family: Not on file    Frequency of Social Gatherings with Friends and Family: Not on file    Attends [employer] Services: Not on file    Active Member of Clubs or Organizations: Not on file    Attends Club or Organization Meetings: Not on file    Marital Status: Not on file   Intimate Partner Violence:     Fear of Current or Ex-Partner: Not on file    Emotionally Abused: Not on file    Physically Abused: Not on file    Sexually Abused: Not on file   Housing Stability:     Unable to Pay for Housing in the Last Year: Not on file    Number of [place] in the Last Year: Not on file    Unstable Housing in the Last Year: Not on file       SCREENINGS    Cira Coma Scale  Eye Opening: Spontaneous  Best Verbal Response: Oriented  Best Motor Response: Obeys commands  Cira Coma Scale Score: 15 Heart Score for chest pain patients  History: Moderately Suspicious  ECG: Normal  Patient Age: > 39 and < 65 years  *Risk factors for Atherosclerotic disease: Hypercholesterolemia,Hypertension,Obesity  Risk Factors: > 3 Risk factors or history of atherosclerotic disease*  Troponin: < 1X normal limit  Heart Score Total: 4      PHYSICAL EXAM    (up to 7 for level 4, 8 or more for level 5)     ED Triage Vitals   BP Temp Temp Source Heart Rate Resp SpO2 Height Weight   06/02/22 2355 06/02/22 2353 06/02/22 2353 06/02/22 2353 -- -- 06/02/22 2353 06/02/22 2353   (!) 153/96 98.5 °F (36.9 °C) Oral 81   5' 8\" (1.727 m) 245 lb (111.1 kg)           General Appearance:  Alert, cooperative, no distress, appears stated age. Head:  Normocephalic, without obvious abnormality, atraumatic. Eyes:  conjunctiva/corneas clear, EOM's intact. Sclera anicteric. ENT: Mucous membranes moist.   Neck: Supple, symmetrical, trachea midline, no adenopathy. No jugular venous distention. Lungs:   No Respiratory Distress. no rales  rhonchi rub   Chest Wall:  Nontender  no deformity   Heart:  Rsr no murmer gallop    Abdomen:   Soft nontender no organomegally    Extremities:  Full range of motion. no deformity   Pulses: Equal  upper and lower Skin:  No rashes or lesions to exposed skin. Neurologic: Alert and oriented X 3. Motor grossly normal.  Speech clear. DIAGNOSTIC RESULTS   LABS:    Labs Reviewed   COMPREHENSIVE METABOLIC PANEL W/ REFLEX TO MG FOR LOW K - Abnormal; Notable for the following components:       Result Value    Chloride 98 (*)     Glucose 134 (*)     CREATININE 0.8 (*)     Calcium 11.1 (*)     ALT 46 (*)     AST 38 (*)     All other components within normal limits   CBC WITH AUTO DIFFERENTIAL   TROPONIN       All other labs were within normal range or not returned as of thisdictation. EKG: All EKG's are interpreted by the Emergency Department Physician who either signs or Co-signs this chart in the absence of a cardiologist.    EKG Interpretation    Interpreted by emergency department physician    Rhythm: normal sinus   Rate: normal  Axis: normal  Ectopy: none  Conduction: normal  ST Segments: no acute change  T Waves: no acute change  Q Waves: none    Clinical Impression: Normal sinus rhythm    Karely Kaur MD      RADIOLOGY:   Non-plain film images such as CT, Ultrasound and MRI are read by the radiologist. Plainradiographic images are visualized and preliminarily interpreted by the  ED Provider with the belowfindings:        Interpretation per the Radiologist below, if available at the time of this note:    XR CHEST PORTABLE   Final Result   No acute cardiopulmonary process.                PROCEDURES   Unless otherwise noted below, none     Procedures    CRITICAL CARE TIME   N/A      CONSULTS:  None    EMERGENCY DEPARTMENT COURSE and DIFFERENTIAL DIAGNOSIS/MDM:   Vitals:    Vitals:    06/02/22 2353 06/02/22 2355   BP:  (!) 153/96   Pulse: 81    Temp: 98.5 °F (36.9 °C)    TempSrc: Oral    Weight: 245 lb (111.1 kg)    Height: 5' 8\" (1.727 m)        Patient was given the following medications:  Medications   0.9 % sodium chloride infusion (has no administration in time range)   aspirin chewable tablet 324 mg (324 mg Oral Given 6/3/22 0117)       Score of 4 patient's had pain off and on last week and several days now with associated dyspnea patient will be admitted for further evaluation    Is this patient to be included in the SEP-1 Core Measure due to severe sepsis or septic shock? No   Exclusion criteria - the patient is NOT to be included for SEP-1 Core Measure due to: Infection is not suspected          FINAL IMPRESSION      1. Chest pain, unspecified type        DISPOSITION/PLAN   DISPOSITION        PATIENT REFERRED TO:  No follow-up provider specified.     DISCHARGE MEDICATIONS:  New Prescriptions    No medications on file       DISCONTINUED MEDICATIONS:  Discontinued Medications    No medications on file              (Please note that portions of this note were completed with a voice recognition program.  Efforts were made to edit the dictations but occasionally words aremis-transcribed.)    Brittney Peralta MD (electronically signed)         Brittney Peralta MD  06/03/22 0042

## 2022-06-03 NOTE — OP NOTE
[address]  Procedure Note    Procedure: [facility], PCI of OM2  Indication: UA    Procedure Details:  Consent Access Bleed R Sedat Start Stop Versed Fentanyl Contrast Fluoro EBL Comp Spec   Yes RRA low [facility] 1621 1717 6 100 99 9.6 <20 None None    US guidance used to determine aforementioned artery patency, size (>2mm), anatomic variations and ideal puncture location.  Real-time US utilized concurrent with vascular needle entry into artery. Image(s) permanently recorded and reported in chart.  Independent trained observer pushed medications at my direction. We monitored the patient's level of consciousness and vital   signs/physiologic status throughout the procedure duration (see start and stop times above, as well as medication dosages). Findings:  Artery Findings/Result   LM Normal   LAD 40% mid diffuse, 80% ostial D2 (Smaller vessel, reserve intervention for med refractory sx)   Cx 30% mid, 99% prox OM2   RI N/A   RCA 30% mid, 70% mid (FFR 0.91)   LVEDP 15   LVG 65%     Intervention:   PCI of OM2 with 3.5X28 Xience VICKEY (PD with 3.5NC)  FFR of RCA = 0.91 = not significant    Post Cath Dx:   CAD as above    Med Rec:   Recommendation Indicated? Not Given Due To: Note   DAPT  Yes  Asa, plavix   STATIN - HIGH DOSE Yes  crestor 20   BETA BLOCKER Yes  Coreg 12.5bid   ACE/ARB/ARNI no     ALDACTONE no       Non-Med Rec:   Category Recommendation   EF ASSESSMENT Noninvasive assessment of EF if LVG deferred as IP/OP as appropriate   TOBACCO Avoidance of first and second hand smoke   DIET/EXERCISE Maintain healthy lifestyle with focus on diet, exercise and weight loss   CARDIAC REHAB Referral to outpatient cardiac rehab phase II will be deferred until patient follow-up in office and then determine patient safety and appropriateness to proceed. STATIN Patient started or continued on max tolerated dose of statin or high intensity statin as appropriate.   If no statin prescribed, secondary to intolerance, allergy or patient refusal.

## 2022-06-03 NOTE — ED NOTES
PT report given to Ania Yeh RN at this time. She states no further questions or concerns at this time. Telemetry monitor requested.      Leonardo Boyd RN  06/03/22 6195

## 2022-06-03 NOTE — PROGRESS NOTES
Pt admitted to CVU bed 2912. Connected to cardiac monitor. Belongings brought down from previous unit. R radial TR band in place, site WNL. Per report, contains 14mL air and ok to begin withdrawing at 1830. EKG completed per order. Integrelin running at 17.8mL/hr upon arrival. Post-cath fluids started per order as well. Pt denies pain nor needs. Call light within reach, bed in lowest position.     Chris Cobian, MENDEZ  6/3/2022

## 2022-06-03 NOTE — CONSULTS
Nephrology Consult Note  [phone]   [facility]. [facility]        Reason for Consult:  hypercalcemia    HISTORY OF PRESENT ILLNESS:                This is a patient with significant past medical history of hypercalcemia, HTN presented with chest pain. Denies any SOB, fever, chills. Denies any N/V/D. Recent history of bug bite completed doxycycline. Had stress test completed today. Nephrology consulted for hypercalcemia-chart reviewed, Rigo has ranged 10.4-11. 1-PTH is reviewed, 6 yrs priro , iCa 5.7  -ca this adm 11.1, PTH is 78.7, Cr normal  -denies any h/o stones. -previously seen by Dr. Jas Figueredo, [place] PTH scan 2016 did not reveal any e/p PTH adenoma     Past Medical History:        Diagnosis Date    Hypertension        Past Surgical History:    No past surgical history on file. Current Medications:    No current facility-administered medications on file prior to encounter. Current Outpatient Medications on File Prior to Encounter   Medication Sig Dispense Refill    losartan (COZAAR) 100 MG tablet Take 100 mg by mouth daily      triamterene-hydroCHLOROthiazide (MAXZIDE-25) 37.5-25 MG per tablet Take 1 tablet by mouth daily      Rosuvastatin Calcium 20 MG CPSP Take 20 mg by mouth daily      doxycycline hyclate (VIBRAMYCIN) 100 mg capsule Take 100 mg by mouth 2 times daily         Allergies:  Patient has no known allergies.     Social History:    Social History     Socioeconomic History    Marital status:      Spouse name: Not on file    Number of children: Not on file    Years of education: Not on file    Highest education level: Not on file   Occupational History    Not on file   Tobacco Use    Smoking status: Never Smoker    Smokeless tobacco: Never Used   Substance and Sexual Activity    Alcohol use: Not on file    Drug use: Not on file    Sexual activity: Not on file   Other Topics Concern    Not on file   Social History Narrative    Not on file     Social Determinants of Health     Financial Resource Strain:     Difficulty of Paying Living Expenses: Not on file   Food Insecurity:     Worried About Running Out of Food in the Last Year: Not on file    Ambar of Food in the Last Year: Not on file   Transportation Needs:     Lack of Transportation (Medical): Not on file    Lack of Transportation (Non-Medical): Not on file   Physical Activity:     Days of Exercise per Week: Not on file    Minutes of Exercise per Session: Not on file   Stress:     Feeling of Stress : Not on file   Social Connections:     Frequency of Communication with Friends and Family: Not on file    Frequency of Social Gatherings with Friends and Family: Not on file    Attends [employer] Services: Not on file    Active Member of [address] [place] [address] [employer] or Organizations: Not on file    Attends Club or Organization Meetings: Not on file    Marital Status: Not on file   Intimate Partner Violence:     Fear of Current or Ex-Partner: Not on file    Emotionally Abused: Not on file    Physically Abused: Not on file    Sexually Abused: Not on file   Housing Stability:     Unable to Pay for Housing in the Last Year: Not on file    Number of [place] in the Last Year: Not on file    Unstable Housing in the Last Year: Not on file       Family History:   No family history on file. Review of Systems:  a comprehensive Review of systems was negative except as noted in HPI     PHYSICAL EXAM:    Vitals:    BP (!) 147/91   Pulse 67   Temp 98 °F (36.7 °C) (Oral)   Resp 17   Ht 5' 8\" (1.727 m)   Wt 246 lb 3.2 oz (111.7 kg)   SpO2 97%   BMI 37.43 kg/m²   No intake/output data recorded. No intake/output data recorded. Physical Exam:  Gen: Resting in bed, NAD. HEENT: anicteric, NC/AT  CV: RRR no m/r/g.  +S1/S2  Lungs: Good respiratory effort, clear air entry   Abd: S/NT +BS  Ext: No edema, no cyanosis  Skin: Warm. No rashes appreciated. : No TTP over bladder, nondistended.   Neuro: Alert and oriented x 3, nonfocal.  Joints: No erythema noted over joints. DATA:    CBC:   Lab Results   Component Value Date    WBC 5.5 06/03/2022    RBC 4.97 06/03/2022    HGB 15.7 06/03/2022    HCT 45.6 06/03/2022    MCV 91.7 06/03/2022    MCH 31.6 06/03/2022    MCHC 34.4 06/03/2022    RDW 12.7 06/03/2022     06/03/2022    MPV 7.6 06/03/2022     BMP:    Lab Results   Component Value Date     06/03/2022    K 4.1 06/03/2022    CL 98 06/03/2022    CO2 28 06/03/2022    BUN 15 06/03/2022    LABALBU 5.0 06/03/2022    CREATININE 0.8 06/03/2022    CALCIUM 11.1 06/03/2022    GFRAA >60 06/03/2022    LABGLOM >60 06/03/2022    GLUCOSE 134 06/03/2022       IMPRESSION/RECOMMENDATIONS:      Hypercalcemia: chronic, calcium has ranged 10.4-11.6 going back to 2012, PTH not suppressed, NM scan in 2016 negative for PTH adenoma-likely FHH-HCTZ can cause hypercalcemia but would expect PTH to be suppressed  -check Mg level,   -no further work up indicated  -can consider genetic testing formutation for CaSR ( inactivating ) as outpatient    Chest pain: stress test + small-moderate inferior lateral reversible defect - plan per cardiology, CTA planned later today  -on ASA/statin    HTN:  -variable-avoid thiazide given hypercalcemia-d/c traimterene-HCTZ  -increase dose of carvedilol, continue losartan    HLD: on statin    Thank you for allowing me to participate in the care of this patient. I will continue to follow along. Please call with questions.     Preston Foster MD

## 2022-06-03 NOTE — CARE COORDINATION
Patient admitted as Observation/OPIB with an anticipated short hospitalization length of stay. Chart reviewed and it appears that patient has minimal needs for discharge at this time. Discussed with patients nurse and requested that case management be notified if discharge needs are identified. *Case management will continue to follow progress and update discharge plan as needed. Pt scheduled for stress testing this morning.      Electronically signed by Jazmyn Umanzor RN on 6/3/2022 at 7:56 AM

## 2022-06-03 NOTE — PROGRESS NOTES
Patient instructed on Julian Protocol Stress Test Procedure including possible side effects and adverse reactions. Verbalizes knowledge and understanding and denies having any questions.

## 2022-06-04 VITALS
WEIGHT: 246 LBS | SYSTOLIC BLOOD PRESSURE: 109 MMHG | RESPIRATION RATE: 16 BRPM | HEIGHT: 68 IN | HEART RATE: 70 BPM | OXYGEN SATURATION: 97 % | TEMPERATURE: 98.2 F | DIASTOLIC BLOOD PRESSURE: 60 MMHG | BODY MASS INDEX: 37.28 KG/M2

## 2022-06-04 LAB
A/G RATIO: 2.2 (ref 1.1–2.2)
ALBUMIN SERPL-MCNC: 4.3 G/DL (ref 3.4–5)
ALP BLD-CCNC: 48 U/L (ref 40–129)
ALT SERPL-CCNC: 34 U/L (ref 10–40)
ANION GAP SERPL CALCULATED.3IONS-SCNC: 8 MMOL/L (ref 3–16)
AST SERPL-CCNC: 20 U/L (ref 15–37)
BILIRUB SERPL-MCNC: 0.4 MG/DL (ref 0–1)
BUN BLDV-MCNC: 13 MG/DL (ref 7–20)
CALCIUM SERPL-MCNC: 9.8 MG/DL (ref 8.3–10.6)
CHLORIDE BLD-SCNC: 106 MMOL/L (ref 99–110)
CHOLESTEROL, TOTAL: 136 MG/DL (ref 0–199)
CO2: 26 MMOL/L (ref 21–32)
CREAT SERPL-MCNC: 0.7 MG/DL (ref 0.9–1.3)
EKG ATRIAL RATE: 58 BPM
EKG ATRIAL RATE: 64 BPM
EKG DIAGNOSIS: NORMAL
EKG DIAGNOSIS: NORMAL
EKG P AXIS: 48 DEGREES
EKG P AXIS: 50 DEGREES
EKG P-R INTERVAL: 164 MS
EKG P-R INTERVAL: 174 MS
EKG Q-T INTERVAL: 396 MS
EKG Q-T INTERVAL: 412 MS
EKG QRS DURATION: 88 MS
EKG QRS DURATION: 90 MS
EKG QTC CALCULATION (BAZETT): 388 MS
EKG QTC CALCULATION (BAZETT): 425 MS
EKG R AXIS: 75 DEGREES
EKG R AXIS: 78 DEGREES
EKG T AXIS: 21 DEGREES
EKG T AXIS: 38 DEGREES
EKG VENTRICULAR RATE: 58 BPM
EKG VENTRICULAR RATE: 64 BPM
GFR AFRICAN AMERICAN: >60
GFR NON-AFRICAN AMERICAN: >60
GLUCOSE BLD-MCNC: 134 MG/DL (ref 70–99)
HCT VFR BLD CALC: 41.5 % (ref 40.5–52.5)
HDLC SERPL-MCNC: 28 MG/DL (ref 40–60)
HEMOGLOBIN: 14.3 G/DL (ref 13.5–17.5)
LDL CHOLESTEROL CALCULATED: 76 MG/DL
MCH RBC QN AUTO: 32 PG (ref 26–34)
MCHC RBC AUTO-ENTMCNC: 34.4 G/DL (ref 31–36)
MCV RBC AUTO: 92.9 FL (ref 80–100)
PDW BLD-RTO: 12.7 % (ref 12.4–15.4)
PLATELET # BLD: 201 K/UL (ref 135–450)
PMV BLD AUTO: 7.9 FL (ref 5–10.5)
POTASSIUM REFLEX MAGNESIUM: 3.8 MMOL/L (ref 3.5–5.1)
RBC # BLD: 4.47 M/UL (ref 4.2–5.9)
SODIUM BLD-SCNC: 140 MMOL/L (ref 136–145)
TOTAL PROTEIN: 6.3 G/DL (ref 6.4–8.2)
TRIGL SERPL-MCNC: 159 MG/DL (ref 0–150)
VLDLC SERPL CALC-MCNC: 32 MG/DL
WBC # BLD: 7.1 K/UL (ref 4–11)

## 2022-06-04 PROCEDURE — G0378 HOSPITAL OBSERVATION PER HR: HCPCS

## 2022-06-04 PROCEDURE — 2580000003 HC RX 258: Performed by: HOSPITALIST

## 2022-06-04 PROCEDURE — 93005 ELECTROCARDIOGRAM TRACING: CPT | Performed by: HOSPITALIST

## 2022-06-04 PROCEDURE — 6370000000 HC RX 637 (ALT 250 FOR IP): Performed by: INTERNAL MEDICINE

## 2022-06-04 PROCEDURE — 93010 ELECTROCARDIOGRAM REPORT: CPT | Performed by: INTERNAL MEDICINE

## 2022-06-04 PROCEDURE — 99222 1ST HOSP IP/OBS MODERATE 55: CPT | Performed by: NURSE PRACTITIONER

## 2022-06-04 PROCEDURE — 80053 COMPREHEN METABOLIC PANEL: CPT

## 2022-06-04 PROCEDURE — 6370000000 HC RX 637 (ALT 250 FOR IP): Performed by: HOSPITALIST

## 2022-06-04 PROCEDURE — 85027 COMPLETE CBC AUTOMATED: CPT

## 2022-06-04 PROCEDURE — 80061 LIPID PANEL: CPT

## 2022-06-04 RX ORDER — ASPIRIN 81 MG/1
81 TABLET ORAL DAILY
Qty: 30 TABLET | Refills: 2 | Status: SHIPPED | OUTPATIENT
Start: 2022-06-04 | End: 2022-07-22 | Stop reason: SDUPTHER

## 2022-06-04 RX ORDER — CARVEDILOL 12.5 MG/1
12.5 TABLET ORAL 2 TIMES DAILY WITH MEALS
Qty: 60 TABLET | Refills: 2 | Status: SHIPPED | OUTPATIENT
Start: 2022-06-04 | End: 2022-06-22 | Stop reason: SDUPTHER

## 2022-06-04 RX ORDER — NITROGLYCERIN 0.4 MG/1
TABLET SUBLINGUAL
Qty: 25 TABLET | Refills: 1 | Status: SHIPPED | OUTPATIENT
Start: 2022-06-04

## 2022-06-04 RX ORDER — CLOPIDOGREL BISULFATE 75 MG/1
75 TABLET ORAL DAILY
Qty: 30 TABLET | Refills: 2 | Status: SHIPPED | OUTPATIENT
Start: 2022-06-05 | End: 2022-06-22 | Stop reason: SDUPTHER

## 2022-06-04 RX ORDER — ROSUVASTATIN CALCIUM 40 MG/1
40 TABLET, COATED ORAL DAILY
Qty: 30 TABLET | Refills: 3 | Status: SHIPPED | OUTPATIENT
Start: 2022-06-05 | End: 2022-06-22 | Stop reason: SDUPTHER

## 2022-06-04 RX ADMIN — SODIUM CHLORIDE: 9 INJECTION, SOLUTION INTRAVENOUS at 04:47

## 2022-06-04 RX ADMIN — ROSUVASTATIN 20 MG: 20 TABLET, FILM COATED ORAL at 08:30

## 2022-06-04 RX ADMIN — CARVEDILOL 12.5 MG: 6.25 TABLET, FILM COATED ORAL at 08:30

## 2022-06-04 RX ADMIN — ASPIRIN 81 MG 81 MG: 81 TABLET ORAL at 08:31

## 2022-06-04 RX ADMIN — CARVEDILOL 12.5 MG: 6.25 TABLET, FILM COATED ORAL at 16:42

## 2022-06-04 RX ADMIN — CLOPIDOGREL BISULFATE 75 MG: 75 TABLET ORAL at 08:31

## 2022-06-04 RX ADMIN — LOSARTAN POTASSIUM 100 MG: 100 TABLET, FILM COATED ORAL at 08:31

## 2022-06-04 RX ADMIN — Medication 10 ML: at 00:32

## 2022-06-04 ASSESSMENT — PAIN SCALES - GENERAL: PAINLEVEL_OUTOF10: 0

## 2022-06-04 NOTE — PLAN OF CARE
Problem: Discharge Planning  Goal: Discharge to home or other facility with appropriate resources  6/4/2022 0932 by Tom Prasad RN  Outcome: Completed  6/4/2022 0518 by Juan Carlos Christian RN  Outcome: Progressing     Problem: Pain  Goal: Verbalizes/displays adequate comfort level or baseline comfort level  6/4/2022 0932 by Tom Prasad RN  Outcome: Completed  Flowsheets (Taken 6/4/2022 7048)  Verbalizes/displays adequate comfort level or baseline comfort level:   Encourage patient to monitor pain and request assistance   Assess pain using appropriate pain scale  6/4/2022 0518 by Juan Carlos Christian RN  Outcome: Progressing     Problem: ABCDS Injury Assessment  Goal: Absence of physical injury  Outcome: Completed     Problem: Safety - Adult  Goal: Free from fall injury  Outcome: Completed

## 2022-06-04 NOTE — PROGRESS NOTES
Nephrology Progress Note  [facility]  [phone]   [facility]. com    Patient:  Quincy Pappas   : 1970    CC:  Hypercalcemia    Subjective:  S/p LHC with PCI and stent to OM2. No chest pain this morning. ROS:   No shortness of breath. Good appetite. SHx:  No visitors this morning. Meds:  Scheduled Meds:   losartan  100 mg Oral Daily    rosuvastatin  20 mg Oral Daily    sodium chloride flush  5-40 mL IntraVENous 2 times per day    aspirin  81 mg Oral Daily    enoxaparin  40 mg SubCUTAneous Daily    carvedilol  12.5 mg Oral BID WC    clopidogrel  75 mg Oral Daily     Continuous Infusions:   sodium chloride      sodium chloride 50 mL/hr at 22 0447     PRN Meds:.sodium chloride flush, sodium chloride, ondansetron **OR** ondansetron, acetaminophen **OR** acetaminophen, polyethylene glycol, aluminum & magnesium hydroxide-simethicone, nitroGLYCERIN, metoprolol    Vitals:  /70   Pulse 65   Temp 97.9 °F (36.6 °C) (Temporal)   Resp 16   Ht 5' 8\" (1.727 m)   Wt 246 lb (111.6 kg)   SpO2 96%   BMI 37.40 kg/m²     Physical Exam:  Gen: Resting in bed, NAD. In the CVICU. HEENT: MMM, OP clear. CV: RRR, no S3.  Lungs: good respiratory effort and clear air entry   Abd: S/NT +BS  Ext: No edema, no cyanosis  Skin: Warm. No rashes appreciated.     Labs:  CBC:   Lab Results   Component Value Date    WBC 7.1 2022    RBC 4.47 2022    HGB 14.3 2022    HCT 41.5 2022    MCV 92.9 2022    MCH 32.0 2022    MCHC 34.4 2022    RDW 12.7 2022     2022    MPV 7.9 2022     BMP:    Lab Results   Component Value Date     2022    K 3.8 2022     2022    CO2 26 2022    BUN 13 2022    LABALBU 4.3 2022    CREATININE 0.7 2022    CALCIUM 9.8 2022    GFRAA >60 2022    LABGLOM >60 2022    GLUCOSE 134 2022       Assessment/Plan:  Hypercalcemia: chronic, calcium has ranged 10.4-11.6 going back to 2012, PTH not suppressed, NM scan in 2016 negative for PTH adenoma-likely FHH-HCTZ can cause hypercalcemia but would expect PTH to be suppressed  -Mg normal at 2.2  -no further work up indicated while here.  -can consider genetic testing formutation for CaSR as outpatient  -Follows with my partner Dr. Megan Evans  -Calcium is better today.     CAD: S/p PCI and VICKEY to OM2 6/3.       HTN:  -variable-avoid thiazide given hypercalcemia-d/c traimterene-HCTZ  -increased dose of carvedilol earlier this admit, continue losartan     HLD: on statin    Disp: Ok for discharge from my standpoint when cleared by other services. He will f/u with my partner Dr. Megan Evans in the office. Luis Bello MD  [facility]  Office Number: [phone]  [facility]. [facility]

## 2022-06-04 NOTE — PROGRESS NOTES
[facility]              Progress Note  Tyson patricia    Admit Date 2022     HPI: 47 y.o. male who presented with Chest pain. ·  He came home from work in the evening . Since then he felt some substernal and left sided chest pain , not related to exertion or so . He ate his dinner and took some Rolaid @ Home thinking it was 2/2 heartburn or so . His chest pain s/s did zahraa for the time being . He went to bed . Around 74.30.53 , he started having recurrence of his chest pains and left sided chest discomfort . He could not get comfortable lying in bed . Hence he called 911 and was BIBA to ED . His s/s did improve en route to ED     Interval history: S/P PCI OM2    Mr. Sherron Knowles denies chest pain, shortness of breath, palpitations; feel great today. RRA site C/D/I . Hx of parathyroid disease Nephrology on board. Subjective:       Scheduled Meds:   losartan  100 mg Oral Daily    rosuvastatin  20 mg Oral Daily    sodium chloride flush  5-40 mL IntraVENous 2 times per day    aspirin  81 mg Oral Daily    enoxaparin  40 mg SubCUTAneous Daily    carvedilol  12.5 mg Oral BID WC    clopidogrel  75 mg Oral Daily     Continuous Infusions:   sodium chloride      sodium chloride Stopped (22 0735)     PRN Meds:sodium chloride flush, sodium chloride, ondansetron **OR** ondansetron, acetaminophen **OR** acetaminophen, polyethylene glycol, aluminum & magnesium hydroxide-simethicone, nitroGLYCERIN, metoprolol       Objective:      Wt Readings from Last 3 Encounters:   22 246 lb (111.6 kg)   Admit weight: Weight: 245 lb (111.1 kg)      Temperature range over 24hrs:   Temp  Av.6 °F (37 °C)  Min: 97.9 °F (36.6 °C)  Max: 99.1 °F (37.3 °C)  Current Respiratory Rate:  Resp: 18  Current Pulse:  Heart Rate: 68  Current Blood Pressure:  BP: 114/71  24hr Blood Pressure Range:  Systolic (65LLG), XVJ:165 , Min:96 , UVQ:385   ; Diastolic (30NFV), VJC:66, Min:61, Max:77    Current Pulse Oximetry:  SpO2: 98 %      Intake/Output Summary (Last 24 hours) at 6/4/2022 0936  Last data filed at 6/3/2022 1900  Gross per 24 hour   Intake 59.42 ml   Output --   Net 59.42 ml       Telemetry monitor: NSR    S/P PCI EKG - Sinus bradycardiaNonspecific ST and T wave abnormalit    Physical Exam:  General:  Awake, alert, NAD  Skin:  Warm and dry  Neck:  No JVD  Chest:  Clear to auscultation, respiration easy  Cardiovascular:  RRR S1S2 no murmur to auscultation  Abdomen:   Bowel sounds normal, abd soft, non-tender  Extremities:  No edema  : unremarkable      Cath:  LM Normal   LAD 40% mid diffuse, 80% ostial D2 (Smaller vessel, reserve intervention for med refractory sx)   Cx 30% mid, 99% prox OM2   RI N/A   RCA 30% mid, 70% mid (FFR 0.91)   LVEDP 15   LVG 65%     Lab Review     Renal Profile:   Lab Results   Component Value Date    CREATININE 0.7 06/04/2022    BUN 13 06/04/2022     06/04/2022    K 3.8 06/04/2022     06/04/2022    CO2 26 06/04/2022     CBC:    Lab Results   Component Value Date    WBC 7.1 06/04/2022    RBC 4.47 06/04/2022    HGB 14.3 06/04/2022    HCT 41.5 06/04/2022    MCV 92.9 06/04/2022    RDW 12.7 06/04/2022     06/04/2022       Cardiac Enzymes:    Lab Results   Component Value Date    TROPONINI <0.01 06/03/2022       Lab Results   Component Value Date    MG 2.20 06/03/2022    No results found for: TSH    Assessment/Plan:   S/P PCI OM2   - RRA site C/D/I  - home later today  - follow appt made    Hypercalcemia  - Ca++ improved 9.8 - Mg 2.2  Okay for D/C from Renal standpoint          Patient Active Problem List   Diagnosis    Chest pain

## 2022-06-04 NOTE — DISCHARGE SUMMARY
[facility]    Patient Demographics    Patient. Celeste Marroquin  Date of Birth. 1970  MRN. [id number]     Primary care provider. No primary care provider on file. (Tel: None)    Admit date: 6/2/2022    Discharge date (blank if same as Note Date): Note Date: 6/4/2022     Reason for Hospitalization. Chief Complaint   Patient presents with    Chest Pain     pt via EMS from home. Pt c/o burning in his chest, relieved by rolaid at 8. Has rash to back of neck, was given antibiotics that he did not finish. Significant Findings. Principal Problem:    Chest pain  Resolved Problems:    * No resolved hospital problems. *       Problems and results from this hospitalization that need follow up. 1. CAD. Follow up with cardiology. 2. Hypercalcemia. Follow up with Dr Amarilis Mascorro (nephrology)    Significant test results and incidental findings. CXR 6/3/2022:  No acute cardiopulmonary process.     MPI 6/3/2022:  Summary    There is a small-moderate inferior lateral reversible defect c/w ischemia.    LV function is normal with no regional abnormalities and EF=63%    Intermediate risk study.      ECG Findings    1 mm upsloping ST depression that is borderline normal.     CT Cors 6/3/2022:  Scattered calcified and noncalcified plaque is seen throughout the coronary   arteries.  There are areas of narrowing in the range of 70% or greater in   their midportion of the right coronary artery, the proximal left anterior   descending coronary artery, and in the mid - distal circumflex coronary   artery.  Study is mildly limited by phase misregistration artifact       Calcium score 1044         Invasive procedures and treatments.    [facility] 6/3/2022:  Findings:  Artery Findings/Result   LM Normal   LAD 40% mid diffuse, 80% ostial D2 (Smaller vessel, reserve intervention for med refractory sx)   Cx 30% mid, 99% prox OM2   RI N/A   RCA 30% mid, 70% mid (FFR 0.91)   LVEDP 15   LVG 65%      Intervention:         PCI of OM2 with 3.5X28 Xience VICKEY (PD with 3.5NC)  FFR of RCA = 0.91 = not significant     Post Cath Dx:       CAD as above      [facility] Course. Patient is a 48 yo male with hx HTN. He presented to ER with c/o left sided chest pain. He was admitted for further evaluation. 1. Chest pain. Troponin negative, no acute changes on EKG. He underwent GXT myoview and walked for 7 minutes without chest pain. Myoview with reversible defect inferior lateral wall. Cardiology evaluated, CT coronary markedly abnormal. Underwent C with multivessel disease, VICKEY placed OM2. There is 80% lesion in ostial D2 which may require intervention if refractory symptoms. Discharged on DAPT, beta blocker and high intensity statin. 2. HTN. BP controlled. Continued losartan. 3. Hypercalcemia. Chronic issue, calcium has ranged 10.4-11.6 going back to 2012. Nuclear scan 2016 negative for PTH adenoma. Evaluated by nephrology, no further work-up indicated. Recommending avoid thiazide given hypercalcemia and triamterene/HCTZ discontinued. Follow up with Dr Marilu Carter. Patient feels well and eager for Dc home. He ambulated in unit with no chest pain or shortness of breath. Reviewed DC plans, follow up and medications. Expresses understanding. Questions answered. Consults. IP CONSULT TO CARDIOLOGY  IP CONSULT TO NEPHROLOGY  IP CONSULT TO CARDIAC REHAB  IP CONSULT TO CARDIAC REHAB    Physical examination on discharge day. /71   Pulse 68   Temp 98.2 °F (36.8 °C) (Temporal)   Resp 18   Ht 5' 8\" (1.727 m)   Wt 246 lb (111.6 kg)   SpO2 98%   BMI 37.40 kg/m²   General appearance. Alert. Looks comfortable. HEENT. Sclera clear. Moist mucus membranes. Cardiovascular. Regular rate and rhythm, normal S1, S2. No murmur. Respiratory. Not using accessory muscles. Clear to auscultation bilaterally, no wheeze. Gastrointestinal. Abdomen soft, non-tender, not distended, normal bowel sounds  Neurology. Facial symmetry. No speech deficits. Moving all extremities equally. Extremities. No edema in lower extremities. Skin. Warm, dry, normal turgor    Condition at time of discharge stable    Medication instructions provided to patient at discharge. Medication List      START taking these medications    aspirin EC 81 MG EC tablet  Take 1 tablet by mouth daily  Notes to patient: Use:  Reduces risk of blood clots  Side effects:  Unusual bleeding, bruising, stomach irritation     carvedilol 12.5 MG tablet  Commonly known as: COREG  Take 1 tablet by mouth 2 times daily (with meals)  Notes to patient: Use:  Lowers blood pressure and heart rate, takes workload off heart  Side effects:  Tiredness, shortness of breath, trouble sleeping, impotence     clopidogrel 75 MG tablet  Commonly known as: PLAVIX  Take 1 tablet by mouth daily  Notes to patient: Use: Prevents platelets from sticking to each other, prevents platlete clot  Side effects: Stomach irritation, unusual bleeding, bruising     nitroGLYCERIN 0.4 MG SL tablet  Commonly known as: NITROSTAT  up to max of 3 total doses. If no relief after 1 dose, call 911. Notes to patient: Use: Treat chest pain  Side effects: headache, flushing, dizziness    ** Take 1 tablet every 5 minutes for chest pain up to 3 times. Call doctor right away.      rosuvastatin 40 MG tablet  Commonly known as: CRESTOR  Take 1 tablet by mouth daily  Replaces: Rosuvastatin Calcium 20 MG Cpsp  Notes to patient: Use:  Cholesterol reduction  Side effects:  Muscle pain or soreness, stomach and intestinal upset        CONTINUE taking these medications    doxycycline hyclate 100 mg capsule  Commonly known as: VIBRAMYCIN     losartan 100 MG tablet  Commonly known as: COZAAR  Notes to patient: Use:  Lowers blood pressure and takes workload off heart  Side Effects: Dry cough, dizziness, drowsiness, sensitivity to the sun        STOP taking these medications    Rosuvastatin Calcium 20 MG Cpsp  Replaced by: rosuvastatin 40 MG tablet     triamterene-hydroCHLOROthiazide 37.5-25 MG per tablet  Commonly known as: MAXZIDE-25           Where to Get Your Medications      These medications were sent to [facility] [id number] Armida Russell, [address] [facility] [address] [phone] Cory Winters [phone]  [address] [place] [address], [address] [place] [address]    Phone: [phone]   · aspirin EC 81 MG EC tablet  · carvedilol 12.5 MG tablet  · clopidogrel 75 MG tablet  · nitroGLYCERIN 0.4 MG SL tablet  · rosuvastatin 40 MG tablet         Discharge recommendations given to patient. Follow Up. PCP in 1 week   Disposition. home  Activity. activity as tolerated  Diet: ADULT DIET; Regular      Spent > 30 minutes in discharge process.     Signed:  CLARICE Rasmussen CNP     6/4/2022 9:09 AM

## 2022-06-06 DIAGNOSIS — I25.83 CORONARY ARTERY DISEASE DUE TO LIPID RICH PLAQUE: Primary | ICD-10-CM

## 2022-06-06 DIAGNOSIS — I25.10 CORONARY ARTERY DISEASE DUE TO LIPID RICH PLAQUE: Primary | ICD-10-CM

## 2022-06-06 LAB — POC ACT LR: >400 SEC

## 2022-06-07 NOTE — PROGRESS NOTES
[address]  6/22/22  Referring: Radhika DRAKE    REASON FOR CONSULT/CHIEF COMPLAINT/HPI     Reason for visit/ Chief complaint   Hospital follow up, chest pain [facility] /3/22   HPI Kaylin Long is a 47 y.o.male I was consulted to see by YESIKA Clark for chest pain, abnormal stress. He had an abnormal stress test, but he denied having any symptoms during the test. He had a Cardiac CTA with a calcium score of 1044, and showed calcified and non calcified plaque throughout his coronary arteries. He then went for a [facility] with Dr Angela Sawyer on 6/3/22 PCI of OM2 with 3.5X28 Xience VICKEY (PD with 3.5 NC) FFR of RCA= 0.91= not significant. Today he states he has had a little anxiety. He was having a little fatigue the first couple days after going back to work since hospitalization. He hasn't started cardiac rehab yet, but he is walking about 3 miles a day without symptoms. Patient is adherent with medications and is tolerating them well without side effects     HISTORY/ALLERGIES/ROS     MedHx:  has a past medical history of Hypertension. SurgHx:  has no past surgical history on file. SocHx:  reports that he has never smoked. He has never used smokeless tobacco. He reports previous alcohol use. He reports that he does not use drugs. Allergies: Patient has no known allergies. MEDICATIONS      Prior to Admission medications    Medication Sig Start Date End Date Taking? Authorizing Provider   carvedilol (COREG) 12.5 MG tablet Take 1 tablet by mouth 2 times daily (with meals) 6/22/22  Yes Shanice Rodriguez MD   clopidogrel (PLAVIX) 75 MG tablet Take 1 tablet by mouth daily 6/22/22  Yes Shanice Rodriguez MD   rosuvastatin (CRESTOR) 40 MG tablet Take 1 tablet by mouth daily 6/22/22  Yes Shanice Rodriguez MD   aspirin EC 81 MG EC tablet Take 1 tablet by mouth daily 6/4/22  Yes CLARICE Mcneill CNP   nitroGLYCERIN (NITROSTAT) 0.4 MG SL tablet up to max of 3 total doses.  If no relief after 1 dose, call 911. 6/4/22  Yes CLARICE Santos - CNP   losartan (COZAAR) 100 MG tablet Take 100 mg by mouth daily   Yes Historical Provider, MD   triamcinolone (KENALOG) 0.1 % cream  5/18/22   Historical Provider, MD   doxycycline hyclate (VIBRAMYCIN) 100 mg capsule Take 100 mg by mouth 2 times daily  Patient not taking: Reported on 6/22/2022    Historical Provider, MD       PHYSICAL EXAM        Vitals:    06/22/22 1258   BP: 110/80   Pulse: 60   Resp: 20   SpO2: 97%    Weight: 239 lb (108.4 kg)     Gen Alert, cooperative, no distress Heart  Regular rate and rhythm, no murmur   Head Normocephalic, atraumatic, no abnormalities Abd  Soft, NT, +BS, no mass, no OM   Eyes PERRLA, conj/corn clear Ext  Ext nl, AT, no C/C, no edema   Nose Nares normal, no drain age, Non-tender Pulse 2+ and symmetric   Throat Lips, mucosa, tongue normal Skin Color/text/turg nl, no rash/lesions   Neck S/S, TM, NT, no bruit Psych Nl mood and affect   Lung CTA-B, unlabored, no DTP     Ch wall NT, no deform       LABS and Imaging     Relevant and available CV data reviewed    EKG personally interpreted: 6/2/22 HR 58 SB Nonspecific ST and T wave abnormality     6/4/22 Lipid Panel   HDL 28 LDL 76    6/3/22 Troponin serial x 3 <0.01    Echo/MRI: none    Stress: 6/2/22  There is a small-moderate inferior lateral reversible defect c/w ischemia. LV function is normal with no regional abnormalities and EF=63%  Intermediate risk study.     CTA Cardiac 6/3/22  Scattered calcified and noncalcified plaque is seen throughout the coronary   arteries.  There are areas of narrowing in the range of 70% or greater in   their midportion of the right coronary artery, the proximal left anterior   descending coronary artery, and in the mid - distal circumflex coronary   artery.  Study is mildly limited by phase misregistration artifact       Calcium score 1044         [facility]HC: 6/3/22 Dr Dayna Ordonez   LM  Normal   LAD 40% mid diffuse, 80% ostial D2 ( Smaller vessel, reserve intervention for med refractory sx)  Cx 30% mid, 99% prox OM2  RI N/A  RCA 30% mid, 70% mid (FFR 0.91)  LVEDP 15  LVG 65%    Intervention: PCI of OM2 with 3.5X28 Xience VICKEY (PD with 3.5 NC)  FFR of RCA= 0.91= not significant     Holter:none      ModerateRisk  ModerateComplexity/Medical Decision Making    Outside/Care everywhere records Reviewed  Labs Reviewed  Prior Imaging, ekg, cath, echo reviewed when available  Medications reviewed  Old Notes reviewed  ASSESSMENT AND PLAN     1.CAD in native artery s/p drug-eluting stent to OM2    Abnormal nuclear perfusion on stress test with normal ECG and normal exercise capacity  6/3/22 [facility] Dr Nima Zapata  PCI of OM2 with 3.5X28 Xience VICKEY (PD with 3.5 NC)  FFR of RCA= 0.91= not significant   ASA, Coreg, Plavix, Crestor  Plan: Cardiac Rehab, repeat lipid panel 3 months    2. Hypercholesterolemia   6/4/22 Lipid Panel   HDL 28 LDL 76  Crestor 40 mg daily  Plan: Fasting lipid panel 3 months    3. HTN  BP Today 110/80  Cozaar   Plan: Continue per PCP    4. Hypercalcemia   Chronic, calcium has ranged 10.4-11.6 going back to 2012, PTH not suppressed, [place] scan in 2016 negative for PTH adenoma-likely [facility]-HCTZ can cause hypercalcemia but would expect PTH to be suppressed  avoid thiazide given hypercalcemia-d/c traimterene-HCTZ  Seen in the past by Dr Nettie Ramirez    Patient counseled on lifestyle modification, diet, and exercise. Follow Up: Return in about 6 months (around 12/22/2022). Mary Ann Latham MD  Cardiologist [address]    Scribe's Attestation: This note was scribed in the presence of Dr. Anjali Yepez MD by Wan Dominique RN. 06/22/22     Physician Attestation  The scribe wrote this note in the presence of me Mary Ann Latham MD). The scribe may have prepopulated components of this note with my historical  intellectual property under my direct supervision.   Any additions to this intellectual property were performed in my presence and at my direction. Furthermore, the content and accuracy of this note have been reviewed by me with edits by me as needed.   Guillermo Lynch MD 6/22/2022 2:44 PM

## 2022-06-21 PROBLEM — I10 HYPERTENSION: Status: ACTIVE | Noted: 2022-06-21

## 2022-06-21 PROBLEM — E78.00 HYPERCHOLESTEROLEMIA: Status: ACTIVE | Noted: 2022-06-21

## 2022-06-21 PROBLEM — I25.10 CORONARY ARTERY DISEASE INVOLVING NATIVE CORONARY ARTERY OF NATIVE HEART: Status: ACTIVE | Noted: 2022-06-21

## 2022-06-21 PROBLEM — Z86.39 HISTORY OF HYPERCALCEMIA: Status: ACTIVE | Noted: 2022-06-21

## 2022-06-22 ENCOUNTER — OFFICE VISIT (OUTPATIENT)
Dept: CARDIOLOGY CLINIC | Age: 52
End: 2022-06-22
Payer: COMMERCIAL

## 2022-06-22 VITALS
WEIGHT: 239 LBS | HEART RATE: 60 BPM | HEIGHT: 68 IN | SYSTOLIC BLOOD PRESSURE: 110 MMHG | BODY MASS INDEX: 36.22 KG/M2 | OXYGEN SATURATION: 97 % | RESPIRATION RATE: 20 BRPM | DIASTOLIC BLOOD PRESSURE: 80 MMHG

## 2022-06-22 DIAGNOSIS — E78.00 HYPERCHOLESTEROLEMIA: ICD-10-CM

## 2022-06-22 DIAGNOSIS — Z86.39 HISTORY OF HYPERCALCEMIA: ICD-10-CM

## 2022-06-22 DIAGNOSIS — I25.10 CORONARY ARTERY DISEASE INVOLVING NATIVE CORONARY ARTERY OF NATIVE HEART, UNSPECIFIED WHETHER ANGINA PRESENT: Primary | ICD-10-CM

## 2022-06-22 DIAGNOSIS — I10 HYPERTENSION, UNSPECIFIED TYPE: ICD-10-CM

## 2022-06-22 PROCEDURE — 3017F COLORECTAL CA SCREEN DOC REV: CPT | Performed by: INTERNAL MEDICINE

## 2022-06-22 PROCEDURE — G8427 DOCREV CUR MEDS BY ELIG CLIN: HCPCS | Performed by: INTERNAL MEDICINE

## 2022-06-22 PROCEDURE — 1036F TOBACCO NON-USER: CPT | Performed by: INTERNAL MEDICINE

## 2022-06-22 PROCEDURE — 99214 OFFICE O/P EST MOD 30 MIN: CPT | Performed by: INTERNAL MEDICINE

## 2022-06-22 PROCEDURE — G8417 CALC BMI ABV UP PARAM F/U: HCPCS | Performed by: INTERNAL MEDICINE

## 2022-06-22 RX ORDER — TRIAMCINOLONE ACETONIDE 1 MG/G
CREAM TOPICAL
COMMUNITY
Start: 2022-05-18

## 2022-06-22 RX ORDER — CARVEDILOL 12.5 MG/1
12.5 TABLET ORAL 2 TIMES DAILY WITH MEALS
Qty: 60 TABLET | Refills: 2 | Status: SHIPPED | OUTPATIENT
Start: 2022-06-22 | End: 2022-07-22 | Stop reason: SDUPTHER

## 2022-06-22 RX ORDER — ROSUVASTATIN CALCIUM 40 MG/1
40 TABLET, COATED ORAL DAILY
Qty: 30 TABLET | Refills: 3 | Status: SHIPPED | OUTPATIENT
Start: 2022-06-22 | End: 2022-07-22 | Stop reason: SDUPTHER

## 2022-06-22 RX ORDER — CLOPIDOGREL BISULFATE 75 MG/1
75 TABLET ORAL DAILY
Qty: 30 TABLET | Refills: 2 | Status: SHIPPED | OUTPATIENT
Start: 2022-06-22 | End: 2022-07-22 | Stop reason: SDUPTHER

## 2022-06-22 NOTE — PATIENT INSTRUCTIONS
Recheck Lipid Panel in 3 months, It is fasting, do not eat or drink for 8 hours prior to test, you may have water  Continue with your low fat and low sodium diet  Continue exercise  Start Cardiac Rehab  Call if you have any questions or concerns, any bleeding you are unable to stop on your own seek emergency care  Follow up appointment 6 months

## 2022-07-21 ENCOUNTER — TELEPHONE (OUTPATIENT)
Dept: CARDIOLOGY CLINIC | Age: 52
End: 2022-07-21

## 2022-07-21 RX ORDER — NITROGLYCERIN 0.4 MG/1
TABLET SUBLINGUAL
Qty: 25 TABLET | Refills: 1 | Status: CANCELLED | OUTPATIENT
Start: 2022-07-21

## 2022-07-21 RX ORDER — ASPIRIN 81 MG/1
81 TABLET ORAL DAILY
Qty: 30 TABLET | Refills: 2 | Status: CANCELLED | OUTPATIENT
Start: 2022-07-21

## 2022-07-21 RX ORDER — CARVEDILOL 12.5 MG/1
12.5 TABLET ORAL 2 TIMES DAILY WITH MEALS
Qty: 60 TABLET | Refills: 2 | Status: CANCELLED | OUTPATIENT
Start: 2022-07-21

## 2022-07-21 RX ORDER — CLOPIDOGREL BISULFATE 75 MG/1
75 TABLET ORAL DAILY
Qty: 30 TABLET | Refills: 2 | Status: CANCELLED | OUTPATIENT
Start: 2022-07-21

## 2022-07-21 RX ORDER — ROSUVASTATIN CALCIUM 40 MG/1
40 TABLET, COATED ORAL DAILY
Qty: 30 TABLET | Refills: 3 | Status: CANCELLED | OUTPATIENT
Start: 2022-07-21

## 2022-07-21 NOTE — TELEPHONE ENCOUNTER
Lov 6/22/2022  Labs no labs cardiology / 6/4/2022  Lrf 6/22/2022 Disp 30+2  Appt no appt scheduled please advise

## 2022-07-21 NOTE — TELEPHONE ENCOUNTER
Medication Refill    Medication needing refilled:   aspirin EC   carvedilol (COREG)  clopidogrel (PLAVIX)   losartan (COZAAR)  rosuvastatin (CRESTOR)      Dosage of the medication:  81 mg  12.5mg  75mg  100mg  40mg    How are you taking this medication (QD, BID, TID, QID, PRN):  Take 1 tablet by mouth daily  Take 1 tablet by mouth 2 times daily (with meals)  Take 1 tablet by mouth daily  Take 100 mg by mouth daily  Take 1 tablet by mouth daily    30 or 90 day supply called in:  90  180  90  90  90  When will you run out of your medication:    Which Pharmacy are we sending the medication to?:    [facility]  [id number]  [place].  [address]  Phone: [phone]  Fax: He didn't have it    NOTE: Per Pt he is now using this Pharmacy for his med  Please advise

## 2022-07-22 ENCOUNTER — TELEPHONE (OUTPATIENT)
Dept: CARDIOLOGY CLINIC | Age: 52
End: 2022-07-22

## 2022-07-22 RX ORDER — CLOPIDOGREL BISULFATE 75 MG/1
75 TABLET ORAL DAILY
Qty: 90 TABLET | Refills: 2 | Status: CANCELLED | OUTPATIENT
Start: 2022-07-22

## 2022-07-22 RX ORDER — CARVEDILOL 12.5 MG/1
12.5 TABLET ORAL 2 TIMES DAILY WITH MEALS
Qty: 180 TABLET | Refills: 3 | Status: SHIPPED | OUTPATIENT
Start: 2022-07-22

## 2022-07-22 RX ORDER — ASPIRIN 81 MG/1
81 TABLET ORAL DAILY
Qty: 90 TABLET | Refills: 3 | Status: SHIPPED | OUTPATIENT
Start: 2022-07-22

## 2022-07-22 RX ORDER — CARVEDILOL 12.5 MG/1
12.5 TABLET ORAL 2 TIMES DAILY WITH MEALS
Qty: 180 TABLET | Refills: 2 | Status: CANCELLED | OUTPATIENT
Start: 2022-07-22

## 2022-07-22 RX ORDER — ROSUVASTATIN CALCIUM 40 MG/1
40 TABLET, COATED ORAL DAILY
Qty: 90 TABLET | Refills: 2 | Status: CANCELLED | OUTPATIENT
Start: 2022-07-22

## 2022-07-22 RX ORDER — ROSUVASTATIN CALCIUM 40 MG/1
40 TABLET, COATED ORAL DAILY
Qty: 90 TABLET | Refills: 3 | Status: SHIPPED | OUTPATIENT
Start: 2022-07-22

## 2022-07-22 RX ORDER — ASPIRIN 81 MG/1
81 TABLET ORAL DAILY
Qty: 90 TABLET | Refills: 2 | Status: CANCELLED | OUTPATIENT
Start: 2022-07-22

## 2022-07-22 RX ORDER — CLOPIDOGREL BISULFATE 75 MG/1
75 TABLET ORAL DAILY
Qty: 90 TABLET | Refills: 3 | Status: SHIPPED | OUTPATIENT
Start: 2022-07-22

## 2022-07-22 RX ORDER — LOSARTAN POTASSIUM 100 MG/1
100 TABLET ORAL DAILY
Qty: 90 TABLET | Refills: 3 | Status: SHIPPED | OUTPATIENT
Start: 2022-07-22

## 2022-07-22 NOTE — TELEPHONE ENCOUNTER
Lov 6/22/2022  Labs no cardiology labs   Lrf 6/22/2022 Disp 30+3  Appt 11/21/2022 Dr Shaw Workman please advise thanks

## 2022-07-22 NOTE — TELEPHONE ENCOUNTER
Pt called in checking on his refills. H wanted to know if they would still be refilled with Dr. Madisyn Crockett being on vacation.

## 2022-07-22 NOTE — TELEPHONE ENCOUNTER
Meds refilled to new pharmacy as requested. I updated  Neli Altamirano. He has an OV with [facility] 11/21/22.

## 2022-11-21 ENCOUNTER — OFFICE VISIT (OUTPATIENT)
Dept: CARDIOLOGY CLINIC | Age: 52
End: 2022-11-21
Payer: COMMERCIAL

## 2022-11-21 VITALS
SYSTOLIC BLOOD PRESSURE: 108 MMHG | OXYGEN SATURATION: 99 % | HEART RATE: 64 BPM | BODY MASS INDEX: 33.33 KG/M2 | HEIGHT: 70 IN | DIASTOLIC BLOOD PRESSURE: 58 MMHG | WEIGHT: 232.8 LBS

## 2022-11-21 DIAGNOSIS — Z86.39 HISTORY OF HYPERCALCEMIA: ICD-10-CM

## 2022-11-21 DIAGNOSIS — E78.00 HYPERCHOLESTEROLEMIA: ICD-10-CM

## 2022-11-21 DIAGNOSIS — I10 HYPERTENSION, UNSPECIFIED TYPE: ICD-10-CM

## 2022-11-21 DIAGNOSIS — I25.10 CORONARY ARTERY DISEASE INVOLVING NATIVE CORONARY ARTERY OF NATIVE HEART, UNSPECIFIED WHETHER ANGINA PRESENT: Primary | ICD-10-CM

## 2022-11-21 PROCEDURE — 99214 OFFICE O/P EST MOD 30 MIN: CPT | Performed by: INTERNAL MEDICINE

## 2022-11-21 PROCEDURE — 3074F SYST BP LT 130 MM HG: CPT | Performed by: INTERNAL MEDICINE

## 2022-11-21 PROCEDURE — 3017F COLORECTAL CA SCREEN DOC REV: CPT | Performed by: INTERNAL MEDICINE

## 2022-11-21 PROCEDURE — 3078F DIAST BP <80 MM HG: CPT | Performed by: INTERNAL MEDICINE

## 2022-11-21 PROCEDURE — G8427 DOCREV CUR MEDS BY ELIG CLIN: HCPCS | Performed by: INTERNAL MEDICINE

## 2022-11-21 PROCEDURE — G8417 CALC BMI ABV UP PARAM F/U: HCPCS | Performed by: INTERNAL MEDICINE

## 2022-11-21 PROCEDURE — G8484 FLU IMMUNIZE NO ADMIN: HCPCS | Performed by: INTERNAL MEDICINE

## 2022-11-21 PROCEDURE — 1036F TOBACCO NON-USER: CPT | Performed by: INTERNAL MEDICINE

## 2022-11-21 NOTE — PROGRESS NOTES
[address]  11/21/22  Referring: Leo ONEIL-YESIKA    REASON FOR CONSULT/CHIEF COMPLAINT/HPI     Reason for visit/ Chief complaint   Follow up  Chest pain [facility] 6/3/22   HPI Anmol Sánchez is a 47 y.o.male here for 6 month follow up. He had an abnormal stress test, but he denied having any symptoms during the test. He had a Cardiac CTA with a calcium score of 1044, and showed calcified and non calcified plaque throughout his coronary arteries. He then went for a [facility] with Dr Neisha Box on 6/3/22 PCI of OM2 with 3.5X28 Xience VICKEY (PD with 3.5 NC) FFR of RCA= 0.91= not significant. Today he states he has been feeling well. Denies chest pain or shortness of breath. He has had a couple bouts of cellulitis after yellow jacket stings and a cold; Other than that, he has been healthy since his last visit. He has been eating a healthy diet. He has been busy caring for his family and working at [employer]. He has been working long hours and hasn't been able to fit in the time to exercise regularly. Patient is adherent with medications and is tolerating them well without side effects     HISTORY/ALLERGIES/ROS     MedHx:  has a past medical history of Hypertension. SurgHx:  has no past surgical history on file. SocHx:  reports that he has never smoked. He has never used smokeless tobacco. He reports that he does not currently use alcohol. He reports that he does not use drugs. Allergies: Patient has no known allergies. MEDICATIONS      Prior to Admission medications    Medication Sig Start Date End Date Taking? Authorizing Provider   carvedilol (COREG) 12.5 MG tablet Take 1 tablet by mouth in the morning and 1 tablet in the evening. Take with meals.  7/22/22  Yes Alonso Antoine MD   clopidogrel (PLAVIX) 75 MG tablet Take 1 tablet by mouth in the morning. 7/22/22  Yes Alonso Antoine MD   rosuvastatin (CRESTOR) 40 MG tablet Take 1 tablet by mouth in the morning. 7/22/22  Yes Alonso Antoine MD aspirin EC 81 MG EC tablet Take 1 tablet by mouth in the morning. 7/22/22  Yes Milli Davis MD   losartan (COZAAR) 100 MG tablet Take 1 tablet by mouth in the morning. 7/22/22  Yes Milli Davis MD   nitroGLYCERIN (NITROSTAT) 0.4 MG SL tablet up to max of 3 total doses. If no relief after 1 dose, call 911. 6/4/22  Yes CLARICE Morton CNP   triamcinolone (KENALOG) 0.1 % cream  5/18/22   Historical Provider, MD   doxycycline hyclate (VIBRAMYCIN) 100 mg capsule Take 100 mg by mouth 2 times daily  Patient not taking: No sig reported    Historical Provider, MD       PHYSICAL EXAM        Vitals:    11/21/22 0841   BP: (!) 108/58   Pulse: 64   SpO2: 99%      Weight: 232 lb 12.8 oz (105.6 kg)     Gen Alert, cooperative, no distress Heart  Regular rate and rhythm, no murmur   Head Normocephalic, atraumatic, no abnormalities Abd  Soft, NT, +BS, no mass, no OM   Eyes PERRLA, conj/corn clear Ext  Ext nl, AT, no C/C, no edema   Nose Nares normal, no drain age, Non-tender Pulse 2+ and symmetric   Throat Lips, mucosa, tongue normal Skin Color/text/turg nl, no rash/lesions   Neck S/S, TM, NT, no bruit Psych Nl mood and affect   Lung CTA-B, unlabored, no DTP     Ch wall NT, no deform       LABS and Imaging     Relevant and available CV data reviewed    EKG personally interpreted: 6/2/22 HR 58 SB Nonspecific ST and T wave abnormality     6/4/22 Lipid Panel   HDL 28 LDL 76    6/3/22 Troponin serial x 3 <0.01    Echo/MRI: none    Stress: 6/2/22  There is a small-moderate inferior lateral reversible defect c/w ischemia. LV function is normal with no regional abnormalities and EF=63%  Intermediate risk study. CTA Cardiac 6/3/22  Scattered calcified and noncalcified plaque is seen throughout the coronary   arteries.   There are areas of narrowing in the range of 70% or greater in   their midportion of the right coronary artery, the proximal left anterior   descending coronary artery, and in the mid - distal circumflex coronary   artery. Study is mildly limited by phase misregistration artifact       Calcium score 1044         [facility]: 6/3/22 Dr Marina Wilkerson   LM  Normal   LAD 40% mid diffuse, 80% ostial D2 ( Smaller vessel, reserve intervention for med refractory sx)  Cx 30% mid, 99% prox OM2  RI N/A  RCA 30% mid, 70% mid (FFR 0.91)  LVEDP 15  LVG 65%    Intervention: PCI of OM2 with 3.5X28 Xience VICKEY (PD with 3.5 NC)  FFR of RCA= 0.91= not significant     Holter:none      ModerateRisk  ModerateComplexity/Medical Decision Making    Outside/Care everywhere records Reviewed  Labs Reviewed  Prior Imaging, ekg, cath, echo reviewed when available  Medications reviewed  Old Notes reviewed  ASSESSMENT AND PLAN     1.CAD in native artery s/p drug-eluting stent to OM2    Abnormal nuclear perfusion on stress test with normal ECG and normal exercise capacity  6/3/22 [facility] Dr Marina Wilkerson  PCI of OM2 with 3.5X28 Xience VICKEY (PD with 3.5 NC)  FFR of RCA= 0.91= not significant   ASA, Coreg, Plavix, Crestor  Plan: Continue risk factor modifications and current regimen. Ok to stop plavix after 12/3 if needed since that will be 6 months after his stents were placed. I doubt his cold intolerance is because of his plavix. 2.Hypercholesterolemia   6/4/22 Lipid Panel   HDL 28 LDL 76  10/7/22  TG 68 HDL 33 LDL 53  Crestor 40 mg daily  Plan: Continue healthy diet and statin. Work on exercising more. 3.HTN  BP today 108/58  Cozaar   Plan: Continue per PCP    4. Hypercalcemia   Chronic, calcium has ranged 10.4-11.6 going back to 2012, PTH not suppressed, NM scan in 2016 negative for PTH adenoma-likely FHH-HCTZ can cause hypercalcemia but would expect PTH to be suppressed  avoid thiazide given hypercalcemia-d/c traimterene-HCTZ  Seen in the past by Dr Juan Champagne    Patient counseled on lifestyle modification, diet, and exercise.     Follow Up: 1 year     Dr Vlad Willard MD  Cardiologist [facility] Attestation: This note was scribed in the presence of Dr. Cinthya Calvert MD by Emma Osborne, MENDEZ. Physician Attestation  The scribe wrote this note in the presence of me Montse Perla MD). The scribe may have prepopulated components of this note with my historical  intellectual property under my direct supervision. Any additions to this intellectual property were performed in my presence and at my direction. Furthermore, the content and accuracy of this note have been reviewed by me with edits by me as needed.   Montse Perla MD 11/21/2022 11:20 AM

## 2022-11-21 NOTE — PATIENT INSTRUCTIONS
No medication changes  Continue Plavix through December 3rd.  Can stop after that if oyu want  Call office with any new cardiac symptoms  Follow up in 1 year

## 2023-04-26 RX ORDER — CARVEDILOL 12.5 MG/1
12.5 TABLET ORAL 2 TIMES DAILY WITH MEALS
Qty: 180 TABLET | Refills: 3 | Status: SHIPPED | OUTPATIENT
Start: 2023-04-26

## 2023-07-10 RX ORDER — ROSUVASTATIN CALCIUM 40 MG/1
40 TABLET, COATED ORAL DAILY
Qty: 90 TABLET | Refills: 3 | Status: SHIPPED | OUTPATIENT
Start: 2023-07-10

## 2023-07-10 RX ORDER — ASPIRIN 81 MG/1
81 TABLET, CHEWABLE ORAL DAILY
Qty: 90 TABLET | Refills: 3 | Status: SHIPPED | OUTPATIENT
Start: 2023-07-10

## 2023-07-10 RX ORDER — CLOPIDOGREL BISULFATE 75 MG/1
75 TABLET ORAL DAILY
Qty: 90 TABLET | Refills: 3 | Status: SHIPPED | OUTPATIENT
Start: 2023-07-10

## 2023-07-10 RX ORDER — LOSARTAN POTASSIUM 100 MG/1
100 TABLET ORAL DAILY
Qty: 90 TABLET | Refills: 3 | Status: SHIPPED | OUTPATIENT
Start: 2023-07-10

## 2024-01-02 NOTE — PROGRESS NOTES
[facility] HEART [place]  1/10/24  Referring: Sisi Clark APRN-CNP    REASON FOR CONSULT/CHIEF COMPLAINT/HPI     Reason for visit/ Chief complaint  CAD, annual follow-up     HPI Jesus Marroquin is a 53 y.o.male here for regular follow up. He had an abnormal stress test, but he denied having any symptoms during the test. He had a Cardiac CTA with a calcium score of 1044, and showed calcified and non calcified plaque throughout his coronary arteries. He then went for a [facility] with Dr Adam on 6/3/22 PCI of OM2 with 3.5X28 Xience VICKEY (PD with 3.5 NC) FFR of RCA= 0.91= not significant.     Today, he feels fine with no symptoms.  He is taking all medications as prescribed.  He just had lipids at his PCPs office, which I reviewed today.  LDL now < 70.  HDL a little better but still just 35.    Patient is adherent with medications and is tolerating them well without side effects     HISTORY/ALLERGIES/ROS     MedHx:  has a past medical history of Hypertension.  SurgHx:  has no past surgical history on file.   SocHx:  reports that he has never smoked. He has never used smokeless tobacco. He reports that he does not currently use alcohol. He reports that he does not use drugs.   Allergies: Patient has no known allergies.     MEDICATIONS      Prior to Admission medications    Medication Sig Start Date End Date Taking? Authorizing Provider   rosuvastatin (CRESTOR) 40 MG tablet Take 1 tablet by mouth daily 7/10/23  Yes Harshal Gonzalez MD   losartan (COZAAR) 100 MG tablet Take 1 tablet by mouth daily 7/10/23  Yes Harshal Gonzalez MD   aspirin (ASPIRIN CHILDRENS) 81 MG chewable tablet Take 1 tablet by mouth daily 7/10/23  Yes Harshal Gonzalez MD   carvedilol (COREG) 12.5 MG tablet Take 1 tablet by mouth 2 times daily (with meals) 4/26/23  Yes Harshal Gonzalez MD   nitroGLYCERIN (NITROSTAT) 0.4 MG SL tablet up to max of 3 total doses. If no relief after 1 dose, call 911. 6/4/22  Yes Sisi Clark, APRN - CNP 
mid (FFR 0.91)  LVEDP 15  LVG 65%    Intervention: PCI of OM2 with 3.5X28 Xience VICKEY (PD with 3.5 NC)  FFR of RCA= 0.91= not significant     Holter:none      ModerateRisk  ModerateComplexity/Medical Decision Making    Outside/Care everywhere records Reviewed  Labs Reviewed  Prior Imaging, ekg, cath, echo reviewed when available  Medications reviewed  Old Notes reviewed  ASSESSMENT AND PLAN   Summary of Assessment and Plan:  1/10/24  *** medication changes today   Continue risk factor modifications   Call for any new or worsening symptoms.     All new cardiac testing and lab results personally reviewed by me during this office visit and discussed with patient.    Patient counseled on lifestyle modification, diet, and exercise.      Last OV 11/21/22:  1.CAD in native artery s/p drug-eluting stent to OM2    Abnormal nuclear perfusion on stress test with normal ECG and normal exercise capacity  6/3/22 [facility] Dr Adam  PCI of OM2 with 3.5X28 Xience VICKEY (PD with 3.5 NC)  FFR of RCA= 0.91= not significant   ASA, Coreg, Plavix, Crestor  Plan: Continue risk factor modifications and current regimen.  Ok to stop plavix after 12/3 if needed since that will be 6 months after his stents were placed.  I doubt his cold intolerance is because of his plavix.    2.Hypercholesterolemia   6/4/22 Lipid Panel   HDL 28 LDL 76  10/7/22  TG 68 HDL 33 LDL 53  Crestor 40 mg daily  Plan: Continue healthy diet and statin.  Work on exercising more.    3.HTN  BP today 108/58  Cozaar   Plan: Continue per PCP    4. Hypercalcemia   Chronic, calcium has ranged 10.4-11.6 going back to 2012, PTH not suppressed, NM scan in 2016 negative for PTH adenoma-likely FHH-HCTZ can cause hypercalcemia but would expect PTH to be suppressed  avoid thiazide given hypercalcemia-d/c traimterene-HCTZ  Seen in the past by Dr Hughes    Patient counseled on lifestyle modification, diet, and exercise.        Follow Up:     Dr Harshal Gonzalez,

## 2024-01-10 ENCOUNTER — OFFICE VISIT (OUTPATIENT)
Dept: CARDIOLOGY CLINIC | Age: 54
End: 2024-01-10
Payer: COMMERCIAL

## 2024-01-10 VITALS
DIASTOLIC BLOOD PRESSURE: 68 MMHG | SYSTOLIC BLOOD PRESSURE: 120 MMHG | BODY MASS INDEX: 36.19 KG/M2 | HEIGHT: 68 IN | HEART RATE: 55 BPM | WEIGHT: 238.8 LBS

## 2024-01-10 DIAGNOSIS — I25.10 CORONARY ARTERY DISEASE INVOLVING NATIVE CORONARY ARTERY OF NATIVE HEART, UNSPECIFIED WHETHER ANGINA PRESENT: Primary | ICD-10-CM

## 2024-01-10 DIAGNOSIS — I10 HYPERTENSION, UNSPECIFIED TYPE: ICD-10-CM

## 2024-01-10 DIAGNOSIS — E78.00 HYPERCHOLESTEROLEMIA: ICD-10-CM

## 2024-01-10 PROCEDURE — 3078F DIAST BP <80 MM HG: CPT | Performed by: INTERNAL MEDICINE

## 2024-01-10 PROCEDURE — G8417 CALC BMI ABV UP PARAM F/U: HCPCS | Performed by: INTERNAL MEDICINE

## 2024-01-10 PROCEDURE — G8484 FLU IMMUNIZE NO ADMIN: HCPCS | Performed by: INTERNAL MEDICINE

## 2024-01-10 PROCEDURE — 93000 ELECTROCARDIOGRAM COMPLETE: CPT | Performed by: INTERNAL MEDICINE

## 2024-01-10 PROCEDURE — 3074F SYST BP LT 130 MM HG: CPT | Performed by: INTERNAL MEDICINE

## 2024-01-10 PROCEDURE — 99214 OFFICE O/P EST MOD 30 MIN: CPT | Performed by: INTERNAL MEDICINE

## 2024-01-10 PROCEDURE — 3017F COLORECTAL CA SCREEN DOC REV: CPT | Performed by: INTERNAL MEDICINE

## 2024-01-10 PROCEDURE — 1036F TOBACCO NON-USER: CPT | Performed by: INTERNAL MEDICINE

## 2024-01-10 PROCEDURE — G8427 DOCREV CUR MEDS BY ELIG CLIN: HCPCS | Performed by: INTERNAL MEDICINE

## 2024-01-10 NOTE — PATIENT INSTRUCTIONS
Continue current medications.  Lipid are at goal  Consider a glass of red wine once or twice a week or OTC resevatrol to help get your HDL up a little    - f/u 1 year, sooner if needed

## 2024-03-18 RX ORDER — CARVEDILOL 12.5 MG/1
12.5 TABLET ORAL 2 TIMES DAILY WITH MEALS
Qty: 180 TABLET | Refills: 3 | Status: SHIPPED | OUTPATIENT
Start: 2024-03-18

## 2024-03-18 NOTE — TELEPHONE ENCOUNTER
Received refill request for Carvdilol from [facility].    Last ov:01/10/2024 AGUSTÍN    Last labs:01/10/2024    Last EK/10/2024     Last Refill:2023    Next appointment:2025 AGUSTÍN Recall

## 2024-06-19 RX ORDER — LOSARTAN POTASSIUM 100 MG/1
100 TABLET ORAL DAILY
Qty: 90 TABLET | Refills: 3 | Status: SHIPPED | OUTPATIENT
Start: 2024-06-19

## 2024-06-19 RX ORDER — ROSUVASTATIN CALCIUM 40 MG/1
40 TABLET, COATED ORAL DAILY
Qty: 90 TABLET | Refills: 3 | Status: SHIPPED | OUTPATIENT
Start: 2024-06-19

## 2024-06-19 NOTE — TELEPHONE ENCOUNTER
Received refill request for crestor and losartan from [facility].    Last ov: 01/10/2024 WAK    Last Refill: 07/10/2023 #90 w/ 3    Next appointment: 01/09/2025  WAK   RECALL LIST

## 2024-07-19 RX ORDER — ASPIRIN 81 MG/1
81 TABLET, CHEWABLE ORAL DAILY
Qty: 90 TABLET | Refills: 3 | Status: SHIPPED | OUTPATIENT
Start: 2024-07-19

## 2024-07-19 NOTE — TELEPHONE ENCOUNTER
Requested Prescriptions     Pending Prescriptions Disp Refills    aspirin (ASPIRIN CHILDRENS) 81 MG chewable tablet 90 tablet 3     Sig: Take 1 tablet by mouth daily      [facility]     Last OV:  1/10/2024 WAK    Next OV:  01/09/2025 WAK  recall    Last Labs: 12/18/2023  [facility]    Last Filled: 07/10/2023 WAK

## 2024-08-07 RX ORDER — ASPIRIN 81 MG/1
81 TABLET, CHEWABLE ORAL DAILY
Qty: 90 TABLET | Refills: 3 | OUTPATIENT
Start: 2024-08-07

## 2024-08-07 NOTE — TELEPHONE ENCOUNTER
LMOM for pt to call back for message from  Lucia Denise RN. Unable to leave results message per HIPAA.

## 2024-08-07 NOTE — TELEPHONE ENCOUNTER
Refilled for 1 year and sent to a different [facility]. Pt can call [facility] and ask for it to be transferred to another [facility]. No need for new script. Please call to advise. Thank you!

## 2024-08-08 RX ORDER — ASPIRIN 81 MG/1
81 TABLET, CHEWABLE ORAL DAILY
Qty: 90 TABLET | Refills: 3 | Status: SHIPPED | OUTPATIENT
Start: 2024-08-08

## 2024-08-14 NOTE — TELEPHONE ENCOUNTER
Last OV: 22 WAK  Next OV: 23 WAK  Last labs: 22 CMP  Last EK22  Last filled:   Disp Refills Start End    carvedilol (COREG) 12.5 MG tablet 180 tablet 3 2022     Sig - Route: Take 1 tablet by mouth in the morning and 1 tablet in the evening.  Take with meals. - Oral    Sent to pharmacy as: Carvedilol 12.5 MG Oral Tablet (COREG)    Cosign for Ordering: Accepted by Dhaval Fleming MD on 2022  3:05 PM    E-Prescribing Status: Receipt confirmed by pharmacy (2022 12:26 PM EDT)
Continue Regimen: Tacrolimus bid
Render In Strict Bullet Format?: No
Detail Level: Zone
Continue Regimen: triamcinolone acetonide 0.1 % topical cream BID 2wks as needed

## 2024-08-23 ENCOUNTER — HOSPITAL ENCOUNTER (OUTPATIENT)
Age: 54
Setting detail: OBSERVATION
Discharge: HOME | End: 2024-08-24
Attending: HOSPITALIST | Admitting: HOSPITALIST
Payer: COMMERCIAL

## 2024-08-23 ENCOUNTER — APPOINTMENT (OUTPATIENT)
Dept: GENERAL RADIOLOGY | Age: 54
End: 2024-08-23
Payer: COMMERCIAL

## 2024-08-23 DIAGNOSIS — M48.02 SPINAL STENOSIS OF CERVICAL REGION: ICD-10-CM

## 2024-08-23 DIAGNOSIS — I20.9 ANGINA PECTORIS (HCC): ICD-10-CM

## 2024-08-23 DIAGNOSIS — R20.2 PARESTHESIA: ICD-10-CM

## 2024-08-23 DIAGNOSIS — R07.9 CHEST PAIN, UNSPECIFIED TYPE: Primary | ICD-10-CM

## 2024-08-23 LAB
ALBUMIN SERPL-MCNC: 4.6 G/DL (ref 3.4–5)
ALBUMIN/GLOB SERPL: 1.6 {RATIO} (ref 1.1–2.2)
ALP SERPL-CCNC: 55 U/L (ref 40–129)
ALT SERPL-CCNC: 29 U/L (ref 10–40)
ANION GAP SERPL CALCULATED.3IONS-SCNC: 9 MMOL/L (ref 3–16)
AST SERPL-CCNC: 28 U/L (ref 15–37)
BASOPHILS # BLD: 0 K/UL (ref 0–0.2)
BASOPHILS NFR BLD: 0.6 %
BILIRUB SERPL-MCNC: 0.8 MG/DL (ref 0–1)
BUN SERPL-MCNC: 12 MG/DL (ref 7–20)
CALCIUM SERPL-MCNC: 10.7 MG/DL (ref 8.3–10.6)
CHLORIDE SERPL-SCNC: 103 MMOL/L (ref 99–110)
CO2 SERPL-SCNC: 25 MMOL/L (ref 21–32)
CREAT SERPL-MCNC: 0.8 MG/DL (ref 0.9–1.3)
DEPRECATED RDW RBC AUTO: 12.4 % (ref 12.4–15.4)
EOSINOPHIL # BLD: 0 K/UL (ref 0–0.6)
EOSINOPHIL NFR BLD: 0.7 %
GFR SERPLBLD CREATININE-BSD FMLA CKD-EPI: >90 ML/MIN/{1.73_M2}
GLUCOSE SERPL-MCNC: 136 MG/DL (ref 70–99)
HCT VFR BLD AUTO: 42.8 % (ref 40.5–52.5)
HGB BLD-MCNC: 15.1 G/DL (ref 13.5–17.5)
LYMPHOCYTES # BLD: 1.6 K/UL (ref 1–5.1)
LYMPHOCYTES NFR BLD: 22.8 %
MCH RBC QN AUTO: 32.3 PG (ref 26–34)
MCHC RBC AUTO-ENTMCNC: 35.2 G/DL (ref 31–36)
MCV RBC AUTO: 92 FL (ref 80–100)
MONOCYTES # BLD: 0.6 K/UL (ref 0–1.3)
MONOCYTES NFR BLD: 8.1 %
NEUTROPHILS # BLD: 4.7 K/UL (ref 1.7–7.7)
NEUTROPHILS NFR BLD: 67.8 %
PLATELET # BLD AUTO: 176 K/UL (ref 135–450)
PMV BLD AUTO: 7.7 FL (ref 5–10.5)
POTASSIUM SERPL-SCNC: 4.4 MMOL/L (ref 3.5–5.1)
PROT SERPL-MCNC: 7.5 G/DL (ref 6.4–8.2)
RBC # BLD AUTO: 4.66 M/UL (ref 4.2–5.9)
SODIUM SERPL-SCNC: 137 MMOL/L (ref 136–145)
TROPONIN, HIGH SENSITIVITY: 7 NG/L (ref 0–22)
TROPONIN, HIGH SENSITIVITY: <6 NG/L (ref 0–22)
TROPONIN, HIGH SENSITIVITY: <6 NG/L (ref 0–22)
WBC # BLD AUTO: 6.9 K/UL (ref 4–11)

## 2024-08-23 PROCEDURE — 85025 COMPLETE CBC W/AUTO DIFF WBC: CPT

## 2024-08-23 PROCEDURE — 2580000003 HC RX 258: Performed by: HOSPITALIST

## 2024-08-23 PROCEDURE — 36415 COLL VENOUS BLD VENIPUNCTURE: CPT

## 2024-08-23 PROCEDURE — 6370000000 HC RX 637 (ALT 250 FOR IP): Performed by: PHYSICIAN ASSISTANT

## 2024-08-23 PROCEDURE — G0378 HOSPITAL OBSERVATION PER HR: HCPCS

## 2024-08-23 PROCEDURE — 80053 COMPREHEN METABOLIC PANEL: CPT

## 2024-08-23 PROCEDURE — 71045 X-RAY EXAM CHEST 1 VIEW: CPT

## 2024-08-23 PROCEDURE — 84484 ASSAY OF TROPONIN QUANT: CPT

## 2024-08-23 PROCEDURE — 99285 EMERGENCY DEPT VISIT HI MDM: CPT

## 2024-08-23 RX ORDER — ONDANSETRON 4 MG/1
4 TABLET, ORALLY DISINTEGRATING ORAL EVERY 8 HOURS PRN
Status: DISCONTINUED | OUTPATIENT
Start: 2024-08-23 | End: 2024-08-24 | Stop reason: HOSPADM

## 2024-08-23 RX ORDER — ROSUVASTATIN CALCIUM 40 MG/1
40 TABLET, COATED ORAL DAILY
Status: DISCONTINUED | OUTPATIENT
Start: 2024-08-24 | End: 2024-08-24 | Stop reason: HOSPADM

## 2024-08-23 RX ORDER — ASPIRIN 81 MG/1
81 TABLET, CHEWABLE ORAL DAILY
Status: DISCONTINUED | OUTPATIENT
Start: 2024-08-24 | End: 2024-08-24 | Stop reason: HOSPADM

## 2024-08-23 RX ORDER — MORPHINE SULFATE 2 MG/ML
2 INJECTION, SOLUTION INTRAMUSCULAR; INTRAVENOUS EVERY 4 HOURS PRN
Status: DISCONTINUED | OUTPATIENT
Start: 2024-08-23 | End: 2024-08-24 | Stop reason: HOSPADM

## 2024-08-23 RX ORDER — NITROGLYCERIN 0.4 MG/1
0.4 TABLET SUBLINGUAL EVERY 5 MIN PRN
Status: DISCONTINUED | OUTPATIENT
Start: 2024-08-23 | End: 2024-08-24 | Stop reason: HOSPADM

## 2024-08-23 RX ORDER — SODIUM CHLORIDE 0.9 % (FLUSH) 0.9 %
5-40 SYRINGE (ML) INJECTION EVERY 12 HOURS SCHEDULED
Status: DISCONTINUED | OUTPATIENT
Start: 2024-08-23 | End: 2024-08-24 | Stop reason: HOSPADM

## 2024-08-23 RX ORDER — POTASSIUM CHLORIDE 1500 MG/1
40 TABLET, EXTENDED RELEASE ORAL PRN
Status: DISCONTINUED | OUTPATIENT
Start: 2024-08-23 | End: 2024-08-24 | Stop reason: HOSPADM

## 2024-08-23 RX ORDER — AMOXICILLIN 500 MG
1 CAPSULE ORAL
COMMUNITY

## 2024-08-23 RX ORDER — ONDANSETRON 2 MG/ML
4 INJECTION INTRAMUSCULAR; INTRAVENOUS EVERY 6 HOURS PRN
Status: DISCONTINUED | OUTPATIENT
Start: 2024-08-23 | End: 2024-08-24 | Stop reason: HOSPADM

## 2024-08-23 RX ORDER — POLYETHYLENE GLYCOL 3350 17 G/17G
17 POWDER, FOR SOLUTION ORAL DAILY PRN
Status: DISCONTINUED | OUTPATIENT
Start: 2024-08-23 | End: 2024-08-24 | Stop reason: HOSPADM

## 2024-08-23 RX ORDER — LOSARTAN POTASSIUM 100 MG/1
100 TABLET ORAL DAILY
Status: DISCONTINUED | OUTPATIENT
Start: 2024-08-24 | End: 2024-08-24 | Stop reason: HOSPADM

## 2024-08-23 RX ORDER — CARVEDILOL 6.25 MG/1
12.5 TABLET ORAL 2 TIMES DAILY WITH MEALS
Status: DISCONTINUED | OUTPATIENT
Start: 2024-08-23 | End: 2024-08-24 | Stop reason: HOSPADM

## 2024-08-23 RX ORDER — ENOXAPARIN SODIUM 100 MG/ML
40 INJECTION SUBCUTANEOUS DAILY
Status: DISCONTINUED | OUTPATIENT
Start: 2024-08-23 | End: 2024-08-24 | Stop reason: HOSPADM

## 2024-08-23 RX ORDER — ACETAMINOPHEN 325 MG/1
650 TABLET ORAL EVERY 6 HOURS PRN
Status: DISCONTINUED | OUTPATIENT
Start: 2024-08-23 | End: 2024-08-24 | Stop reason: HOSPADM

## 2024-08-23 RX ORDER — GINSENG 100 MG
25 CAPSULE ORAL
COMMUNITY

## 2024-08-23 RX ORDER — POTASSIUM CHLORIDE 7.45 MG/ML
10 INJECTION INTRAVENOUS PRN
Status: DISCONTINUED | OUTPATIENT
Start: 2024-08-23 | End: 2024-08-24 | Stop reason: HOSPADM

## 2024-08-23 RX ORDER — SODIUM CHLORIDE 9 MG/ML
INJECTION, SOLUTION INTRAVENOUS PRN
Status: DISCONTINUED | OUTPATIENT
Start: 2024-08-23 | End: 2024-08-24 | Stop reason: HOSPADM

## 2024-08-23 RX ORDER — ACETAMINOPHEN 650 MG/1
650 SUPPOSITORY RECTAL EVERY 6 HOURS PRN
Status: DISCONTINUED | OUTPATIENT
Start: 2024-08-23 | End: 2024-08-24 | Stop reason: HOSPADM

## 2024-08-23 RX ORDER — MV-MINS/FOLIC/LYCOPENE/GINKGO 400-300MCG
1 TABLET ORAL
COMMUNITY

## 2024-08-23 RX ORDER — MAGNESIUM SULFATE IN WATER 40 MG/ML
2000 INJECTION, SOLUTION INTRAVENOUS PRN
Status: DISCONTINUED | OUTPATIENT
Start: 2024-08-23 | End: 2024-08-24 | Stop reason: HOSPADM

## 2024-08-23 RX ORDER — SODIUM CHLORIDE 0.9 % (FLUSH) 0.9 %
5-40 SYRINGE (ML) INJECTION PRN
Status: DISCONTINUED | OUTPATIENT
Start: 2024-08-23 | End: 2024-08-24 | Stop reason: HOSPADM

## 2024-08-23 RX ORDER — ASPIRIN 81 MG/1
324 TABLET, CHEWABLE ORAL ONCE
Status: COMPLETED | OUTPATIENT
Start: 2024-08-23 | End: 2024-08-23

## 2024-08-23 RX ADMIN — ASPIRIN 243 MG: 81 TABLET, CHEWABLE ORAL at 16:15

## 2024-08-23 RX ADMIN — SODIUM CHLORIDE, PRESERVATIVE FREE 10 ML: 5 INJECTION INTRAVENOUS at 20:10

## 2024-08-23 ASSESSMENT — PAIN SCALES - GENERAL: PAINLEVEL_OUTOF10: 1

## 2024-08-23 ASSESSMENT — PAIN DESCRIPTION - DESCRIPTORS: DESCRIPTORS: DISCOMFORT

## 2024-08-23 ASSESSMENT — LIFESTYLE VARIABLES
HOW OFTEN DO YOU HAVE A DRINK CONTAINING ALCOHOL: NEVER
HOW MANY STANDARD DRINKS CONTAINING ALCOHOL DO YOU HAVE ON A TYPICAL DAY: PATIENT DOES NOT DRINK

## 2024-08-23 ASSESSMENT — PAIN DESCRIPTION - LOCATION: LOCATION: GENERALIZED

## 2024-08-23 ASSESSMENT — PAIN - FUNCTIONAL ASSESSMENT: PAIN_FUNCTIONAL_ASSESSMENT: 0-10

## 2024-08-23 ASSESSMENT — HEART SCORE: ECG: NORMAL

## 2024-08-23 NOTE — PROGRESS NOTES
Pt admitted to 5c from ER, no c/o chest pain at the moment, resting in bed calmly, BP (!) 147/87   Pulse 56   Temp 97 °F (36.1 °C)   Resp 18   Ht 1.727 m (5' 8\")   Wt 99.8 kg (220 lb)   SpO2 99%   BMI 33.45 kg/m²   SB/SR on tele hr 49-70's, oriented to room and call light.

## 2024-08-23 NOTE — PROGRESS NOTES
Pharmacy Home Medication Reconciliation Note    A medication reconciliation has been completed for Jesus Marroquin 1970    Pharmacy: [facility] [address] [place] [address]  Information provided by: patient    The patient's home medication list is as follows:  No current facility-administered medications on file prior to encounter.     Current Outpatient Medications on File Prior to Encounter   Medication Sig Dispense Refill    Multiple Vitamins-Minerals (ONE-A-DAY MENS 50+ ADVANTAGE) TABS Take 1 tablet by mouth daily (with breakfast)      vitamin D (CHOLECALCIFEROL) 25 MCG (1000 UT) TABS tablet Take 1 tablet by mouth daily (with breakfast)      zinc 50 MG TABS tablet Take 0.5 tablets by mouth daily (with breakfast)      Potassium 99 MG TABS Take 1 tablet by mouth daily (with breakfast)      Omega-3 Fatty Acids (FISH OIL) 1200 MG CAPS Take 1,200 mg by mouth daily (with breakfast)      aspirin (ASPIRIN CHILDRENS) 81 MG chewable tablet Take 1 tablet by mouth daily 90 tablet 3    rosuvastatin (CRESTOR) 40 MG tablet Take 1 tablet by mouth daily 90 tablet 3    losartan (COZAAR) 100 MG tablet Take 1 tablet by mouth daily 90 tablet 3    carvedilol (COREG) 12.5 MG tablet Take 1 tablet by mouth 2 times daily (with meals) 180 tablet 3    nitroGLYCERIN (NITROSTAT) 0.4 MG SL tablet up to max of 3 total doses. If no relief after 1 dose, call 911. (Patient taking differently: Place 1 tablet under the tongue every 5 minutes as needed for Chest pain up to max of 3 total doses. If no relief after 1 dose, call 911.) 25 tablet 1       Of note, patient took all AM meds prior to ED arrival.    Timing of last doses updated.    Thank you,  Angelina Lee CPhT

## 2024-08-23 NOTE — ED PROVIDER NOTES
[facility]  EMERGENCY DEPARTMENT ENCOUNTER        Pt Name: Jesus Marroquin  MRN: [id number]  Birthdate 1970  Date of evaluation: 8/23/2024  Provider: Andrew Paiz PA-C  PCP: Raymond Coello DO  Note Started: 6:34 PM EDT 8/23/24      RAMANDEEP. I have evaluated this patient.        CHIEF COMPLAINT       Chief Complaint   Patient presents with    Numbness     Pt is a pt of dr simmons, known heart blockage and this week does not feel normal.  Has had pain in left bicep, numbness and tingling in his fingers and legs.  Has been on going all week.  Felt the tingling more today.         HISTORY OF PRESENT ILLNESS: 1 or more Elements     History From: patient  Limitations to history : None    Jesus Marroquin is a 54 y.o. male who presents to the emergency department with a chief complaint of a 3 to 4-day history of some intermittent chest discomfort with some discomfort in his left shoulder and into his left arm with some paresthesias in his bilateral hands and feet.  He states the paresthesias were worse when he was at work earlier today prompting his evaluation in the emergency department tonight.  At the time I see him he states he is only having some discomfort in his left upper arm and shoulder.  Denies shortness of breath.  Has history of coronary artery disease with 1 stent 2 years ago, hypertension hyperlipidemia and some family history of heart disease.  He states this feels slightly similar to when he had his stent 2 years ago but not as severe.  Denies shortness of breath, abdominal pain or urinary bowel symptoms.    Nursing Notes were all reviewed and agreed with or any disagreements were addressed in the HPI.    REVIEW OF SYSTEMS :      Review of Systems    Positives and Pertinent negatives as per HPI.     SURGICAL HISTORY   No past surgical history on file.    CURRENTMEDICATIONS       Current Discharge Medication List        CONTINUE these medications which have NOT CHANGED    Details   Multiple

## 2024-08-23 NOTE — H&P
Current Outpatient Medications on File Prior to Encounter   Medication Sig Dispense Refill    aspirin (ASPIRIN CHILDRENS) 81 MG chewable tablet Take 1 tablet by mouth daily 90 tablet 3    rosuvastatin (CRESTOR) 40 MG tablet Take 1 tablet by mouth daily 90 tablet 3    losartan (COZAAR) 100 MG tablet Take 1 tablet by mouth daily 90 tablet 3    carvedilol (COREG) 12.5 MG tablet Take 1 tablet by mouth 2 times daily (with meals) 180 tablet 3    nitroGLYCERIN (NITROSTAT) 0.4 MG SL tablet up to max of 3 total doses. If no relief after 1 dose, call 911. 25 tablet 1       Allergies:  No Known Allergies     Social History:   reports that he has never smoked. He has never used smokeless tobacco. He reports that he does not currently use alcohol. He reports that he does not use drugs.     Family History:  family history is not on file. ,     Physical Exam:  BP (!) 134/95   Pulse 55   Temp 98.2 °F (36.8 °C)   Resp 18   Ht 1.727 m (5' 8\")   Wt 99.8 kg (220 lb)   SpO2 100%   BMI 33.45 kg/m²     General appearance:  Appears comfortable. Well nourished  Eyes: Sclera clear, pupils equal  ENT: Moist mucus membranes, no thrush. Trachea midline.  Cardiovascular: Regular rhythm, normal S1, S2. No murmur, gallop, rub. No edema in lower extremities  Respiratory: Clear to auscultation bilaterally, no wheeze, good inspiratory effort  Gastrointestinal: Abdomen soft, non-tender, not distended, normal bowel sounds  Musculoskeletal: No cyanosis in digits, neck supple  Neurology: Cranial nerves grossly intact. Alert and oriented in time, place and person. No speech or motor deficits  Psychiatry: Appropriate affect. Not agitated  Skin: Warm, dry, normal turgor, no rash    Labs:  CBC:   Lab Results   Component Value Date/Time    WBC 6.9 08/23/2024 04:21 PM    RBC 4.66 08/23/2024 04:21 PM    HGB 15.1 08/23/2024 04:21 PM    HCT 42.8 08/23/2024 04:21 PM    MCV 92.0 08/23/2024 04:21 PM    MCH 32.3 08/23/2024 04:21 PM    MCHC 35.2 08/23/2024  04:21 PM    RDW 12.4 08/23/2024 04:21 PM     08/23/2024 04:21 PM    MPV 7.7 08/23/2024 04:21 PM     BMP:    Lab Results   Component Value Date/Time     08/23/2024 04:21 PM    K 4.4 08/23/2024 04:21 PM     08/23/2024 04:21 PM    CO2 25 08/23/2024 04:21 PM    BUN 12 08/23/2024 04:21 PM    CREATININE 0.8 08/23/2024 04:21 PM    CALCIUM 10.7 08/23/2024 04:21 PM    GFRAA >60 06/04/2022 03:48 AM    LABGLOM >90 08/23/2024 04:21 PM    GLUCOSE 136 08/23/2024 04:21 PM       Chest Xray:   EKG:    I visualized CXR images and EKG strips-         Problem List  Principal Problem:    Chest pain  Resolved Problems:    * No resolved hospital problems. *        Assessment/Plan:   Assessment:     1. Chest pain  Chest pain  - unclear etilogy. ddx iclude BHUPINDER, angina, MSK, GI cause  - intial work up no acute ischemia  With history of CAD and similar presentation on this symptom.  Warrants further workup.  I have ordered stress test.  Patient is already on aspirin statin  .  Cardiology consulted          Massimo Maldonado MD    8/23/2024 5:31 PM

## 2024-08-23 NOTE — ED NOTES
ED TO INPATIENT SBAR HANDOFF    Patient Name: Jesus Marroquin   :  1970  54 y.o.   MRN:  [id number]  Preferred Name  Jesus   ED Room #:  ED-0010/10  Family/Caregiver Present no   Restraints no   Sitter no   Sepsis Risk Score      Situation  Code Status: Prior No additional code details.    Allergies: Patient has no known allergies.  Weight: Patient Vitals for the past 96 hrs (Last 3 readings):   Weight   24 1442 99.8 kg (220 lb)     Arrived from: home  Chief Complaint:   Chief Complaint   Patient presents with    Numbness     Pt is a pt of dr simmons, known heart blockage and this week does not feel normal.  Has had pain in left bicep, numbness and tingling in his fingers and legs.  Has been on going all week.  Felt the tingling more today.       Hospital Problem/Diagnosis:  Principal Problem:    Chest pain  Resolved Problems:    * No resolved hospital problems. *    Imaging:   XR CHEST PORTABLE   Final Result   No acute cardiopulmonary process.           Abnormal labs:   Abnormal Labs Reviewed   COMPREHENSIVE METABOLIC PANEL W/ REFLEX TO MG FOR LOW K - Abnormal; Notable for the following components:       Result Value    Glucose 136 (*)     Creatinine 0.8 (*)     Calcium 10.7 (*)     All other components within normal limits     Critical values: no     Abnormal Assessment Findings:     Background  History:   Past Medical History:   Diagnosis Date    Hypertension        Assessment    Vitals/MEWS: MEWS Score: 1  Level of Consciousness: Alert (0)   Vitals:    24 1442   BP: (!) 134/95   Pulse: 55   Resp: 18   Temp: 98.2 °F (36.8 °C)   SpO2: 100%   Weight: 99.8 kg (220 lb)   Height: 1.727 m (5' 8\")     FiO2 (%):   O2 Flow Rate: O2 Device: None (Room air)    Cardiac Rhythm:    Pain Assessment:  [] Verbal [] Blackburn Baker Scale  Pain Scale: Pain Assessment  Pain Assessment: 0-10  Pain Level: 1  Pain Location: Generalized  Pain Descriptors: Discomfort  Last documented pain score (0-10 scale) Pain Level: 1  Last

## 2024-08-24 ENCOUNTER — APPOINTMENT (OUTPATIENT)
Dept: MRI IMAGING | Age: 54
End: 2024-08-24
Payer: COMMERCIAL

## 2024-08-24 ENCOUNTER — APPOINTMENT (OUTPATIENT)
Age: 54
End: 2024-08-24
Attending: HOSPITALIST
Payer: COMMERCIAL

## 2024-08-24 VITALS
DIASTOLIC BLOOD PRESSURE: 81 MMHG | RESPIRATION RATE: 14 BRPM | OXYGEN SATURATION: 100 % | HEART RATE: 60 BPM | BODY MASS INDEX: 34.86 KG/M2 | SYSTOLIC BLOOD PRESSURE: 121 MMHG | TEMPERATURE: 98.1 F | HEIGHT: 68 IN | WEIGHT: 230 LBS

## 2024-08-24 PROBLEM — E78.2 MIXED HYPERLIPIDEMIA: Status: ACTIVE | Noted: 2024-08-24

## 2024-08-24 LAB
BASOPHILS # BLD: 0 K/UL (ref 0–0.2)
BASOPHILS NFR BLD: 0.7 %
DEPRECATED RDW RBC AUTO: 12.7 % (ref 12.4–15.4)
ECHO BSA: 2.24 M2
EKG ATRIAL RATE: 55 BPM
EKG DIAGNOSIS: NORMAL
EKG P AXIS: 59 DEGREES
EKG P-R INTERVAL: 172 MS
EKG Q-T INTERVAL: 406 MS
EKG QRS DURATION: 90 MS
EKG QTC CALCULATION (BAZETT): 388 MS
EKG R AXIS: 65 DEGREES
EKG T AXIS: 35 DEGREES
EKG VENTRICULAR RATE: 55 BPM
EOSINOPHIL # BLD: 0.1 K/UL (ref 0–0.6)
EOSINOPHIL NFR BLD: 1.1 %
HCT VFR BLD AUTO: 44 % (ref 40.5–52.5)
HGB BLD-MCNC: 14.9 G/DL (ref 13.5–17.5)
LYMPHOCYTES # BLD: 2.2 K/UL (ref 1–5.1)
LYMPHOCYTES NFR BLD: 33.3 %
MCH RBC QN AUTO: 31.4 PG (ref 26–34)
MCHC RBC AUTO-ENTMCNC: 33.8 G/DL (ref 31–36)
MCV RBC AUTO: 92.7 FL (ref 80–100)
MONOCYTES # BLD: 0.7 K/UL (ref 0–1.3)
MONOCYTES NFR BLD: 10.9 %
NEUTROPHILS # BLD: 3.6 K/UL (ref 1.7–7.7)
NEUTROPHILS NFR BLD: 54 %
NUC STRESS EJECTION FRACTION: 63 %
NUC STRESS LV EDV: 125 ML (ref 67–155)
NUC STRESS LV ESV: 46 ML (ref 22–58)
NUC STRESS LV MASS: 161 G
PLATELET # BLD AUTO: 170 K/UL (ref 135–450)
PMV BLD AUTO: 8 FL (ref 5–10.5)
RBC # BLD AUTO: 4.74 M/UL (ref 4.2–5.9)
STRESS BASELINE DIAS BP: 85 MMHG
STRESS BASELINE HR: 52 BPM
STRESS BASELINE SYS BP: 148 MMHG
STRESS ESTIMATED WORKLOAD: 10.1 METS
STRESS EXERCISE DUR MIN: 7 MIN
STRESS EXERCISE DUR SEC: 15 SEC
STRESS O2 SAT PEAK: 98 %
STRESS O2 SAT REST: 98 %
STRESS PEAK DIAS BP: 81 MMHG
STRESS PEAK SYS BP: 192 MMHG
STRESS PERCENT HR ACHIEVED: 89 %
STRESS POST PEAK HR: 148 BPM
STRESS RATE PRESSURE PRODUCT: NORMAL BPM*MMHG
STRESS TARGET HR: 167 BPM
TID: 1.04
TSH SERPL DL<=0.005 MIU/L-ACNC: 0.89 UIU/ML (ref 0.27–4.2)
VIT B12 SERPL-MCNC: 560 PG/ML (ref 211–911)
WBC # BLD AUTO: 6.6 K/UL (ref 4–11)

## 2024-08-24 PROCEDURE — G0378 HOSPITAL OBSERVATION PER HR: HCPCS

## 2024-08-24 PROCEDURE — 78452 HT MUSCLE IMAGE SPECT MULT: CPT | Performed by: INTERNAL MEDICINE

## 2024-08-24 PROCEDURE — 93010 ELECTROCARDIOGRAM REPORT: CPT | Performed by: INTERNAL MEDICINE

## 2024-08-24 PROCEDURE — 99223 1ST HOSP IP/OBS HIGH 75: CPT | Performed by: INTERNAL MEDICINE

## 2024-08-24 PROCEDURE — 72141 MRI NECK SPINE W/O DYE: CPT

## 2024-08-24 PROCEDURE — 93016 CV STRESS TEST SUPVJ ONLY: CPT | Performed by: INTERNAL MEDICINE

## 2024-08-24 PROCEDURE — 85025 COMPLETE CBC W/AUTO DIFF WBC: CPT

## 2024-08-24 PROCEDURE — 93017 CV STRESS TEST TRACING ONLY: CPT

## 2024-08-24 PROCEDURE — 84443 ASSAY THYROID STIM HORMONE: CPT

## 2024-08-24 PROCEDURE — A9502 TC99M TETROFOSMIN: HCPCS | Performed by: HOSPITALIST

## 2024-08-24 PROCEDURE — 78452 HT MUSCLE IMAGE SPECT MULT: CPT

## 2024-08-24 PROCEDURE — 3430000000 HC RX DIAGNOSTIC RADIOPHARMACEUTICAL: Performed by: HOSPITALIST

## 2024-08-24 PROCEDURE — 6370000000 HC RX 637 (ALT 250 FOR IP): Performed by: HOSPITALIST

## 2024-08-24 PROCEDURE — 93005 ELECTROCARDIOGRAM TRACING: CPT | Performed by: INTERNAL MEDICINE

## 2024-08-24 PROCEDURE — 36415 COLL VENOUS BLD VENIPUNCTURE: CPT

## 2024-08-24 PROCEDURE — 2580000003 HC RX 258: Performed by: HOSPITALIST

## 2024-08-24 PROCEDURE — 82607 VITAMIN B-12: CPT

## 2024-08-24 PROCEDURE — 93018 CV STRESS TEST I&R ONLY: CPT | Performed by: INTERNAL MEDICINE

## 2024-08-24 RX ADMIN — LOSARTAN POTASSIUM 100 MG: 100 TABLET, FILM COATED ORAL at 12:39

## 2024-08-24 RX ADMIN — TETROFOSMIN 32.2 MILLICURIE: 1.38 INJECTION, POWDER, LYOPHILIZED, FOR SOLUTION INTRAVENOUS at 09:40

## 2024-08-24 RX ADMIN — ASPIRIN 81 MG: 81 TABLET, CHEWABLE ORAL at 12:38

## 2024-08-24 RX ADMIN — ROSUVASTATIN CALCIUM 40 MG: 40 TABLET, COATED ORAL at 12:39

## 2024-08-24 RX ADMIN — SODIUM CHLORIDE, PRESERVATIVE FREE 10 ML: 5 INJECTION INTRAVENOUS at 11:55

## 2024-08-24 RX ADMIN — CARVEDILOL 12.5 MG: 6.25 TABLET, FILM COATED ORAL at 12:38

## 2024-08-24 RX ADMIN — TETROFOSMIN 11.1 MILLICURIE: 1.38 INJECTION, POWDER, LYOPHILIZED, FOR SOLUTION INTRAVENOUS at 08:40

## 2024-08-24 ASSESSMENT — PAIN SCALES - GENERAL
PAINLEVEL_OUTOF10: 0

## 2024-08-24 NOTE — PROGRESS NOTES
Data- discharge order received, pt verbalized agreement to discharge, disposition to previous residence, no needs for HHC/DME.     Action- discharge instructions prepared and given to pt, pt verbalized understanding. Medication information packet given r/t NEW and/or CHANGED prescriptions emphasizing name/purpose/side effects, pt verbalized understanding. Discharge instruction summary: Diet- Regular, Activity- No Restrictions, Primary Care Physician as follows: Raymond Coello -[phone] f/u appointment to be made as needed.     1. WEIGHT: Admit Weight - Scale: 99.8 kg (220 lb) (08/23/24 1442)        Today  Weight - Scale: 104.3 kg (230 lb) (08/24/24 0952)       2. O2 SAT.: SpO2: 100 % (08/24/24 1153)    Response- Pt belongings gathered, IV removed. Disposition is home (no HHC/DME needs), transported with self, taken to lobby via w/c w/ MICHAEL, Kailyn, no complications.

## 2024-08-24 NOTE — CONSULTS
[facility]   Cardiology Consultation   Date: 8/24/2024  Reason for Consultation: chest pain   Consult Requesting Physician: Massimo Maldonado MD     Chief Complaint   Patient presents with    Numbness     Pt is a pt of dr gnozalez, known heart blockage and this week does not feel normal.  Has had pain in left bicep, numbness and tingling in his fingers and legs.  Has been on going all week.  Felt the tingling more today.       HPI: Jesus Marroquin is a 54 y.o. male with history of CAD, hyperlipidemia, hypertension was admitted due to chest pain.  Patient has been having a rough week.  His left long way and has had back pain shoulder pain, arm pain with tingling and numbness.  When asked if it resembles his prior anginal pain, he does not think it is similar to that.  He follows with Dr. Gonzalez.    He is feeling fine now with some numbness in his arm and some discomfort in his shoulder and scapular region.  Past Medical History:   Diagnosis Date    CAD (coronary artery disease)     Hyperlipidemia     Hypertension     Parathyroid disease (HCC)         Past Surgical History:   Procedure Laterality Date    CORONARY STENT PLACEMENT      2022       No Known Allergies    Social History:  Reviewed.  reports that he has never smoked. He has never used smokeless tobacco. He reports that he does not currently use alcohol. He reports that he does not use drugs.     Family History:  Reviewed. family history is not on file.   Family Hx was Reviewed. No relevant family history is present.    Review of System:  All other systems reviewed and are negative except for that noted above. Pertinent negatives are:     General: negative for fever, chills   Ophthalmic ROS: negative for - eye pain or loss of vision  ENT ROS: negative for - headaches, sore throat   Respiratory: negative for - cough, sputum  Cardiovascular: Reviewed in HPI  Gastrointestinal: negative for - abdominal pain, diarrhea, N/V  Hematology: negative for - bleeding,  disease involving native coronary artery of native heart 06/21/2022    Hypercholesterolemia 06/21/2022    Hypertension 06/21/2022    History of hypercalcemia 06/21/2022    Chest pain 06/03/2022      Active Hospital Problems    Diagnosis Date Noted    Chest pain [R07.9] 06/03/2022     Priority: Medium       Assessment:       Plan:     -Chest pain, paresthesia,CAD    Symptoms are not typical for coronary ischemia.  It likely is related to musculoskeletal issues according to patient's history.  Troponin is negative and EKG is unremarkable.  Chest x-ray negative    Continue home medications.    Will do a stress test to rule out any worsening CAD.  If the stress test is negative, patient can be discharged and follow-up with Dr. Gonzalez.    Evaluation of musculoskeletal source of pain as per primary team.    -Hypertension    Blood pressure is slightly elevated.  Continue home medication and adjust as needed.    -Hyperlipidemia    Continue statin       I independently reviewed and interpreted all relevant labs  , telemetry, EKGecho stress test  cath   CXR,  and use them for decision making whether mentioned or not.  All previous relevant notes including notes and data from other hospitals and prior records were reviewed.    Thank you for allowing me to participate in the care of Jesus Marroquin     NOTE: This report was transcribed using voice recognition software. Every effort was made to ensure accuracy, however, inadvertent computerized transcription errors may be present.

## 2024-08-24 NOTE — PLAN OF CARE
Problem: Discharge Planning  Goal: Discharge to home or other facility with appropriate resources  8/24/2024 1001 by Susan Mcbride RN  Outcome: Progressing    Problem: Safety - Adult  Goal: Free from fall injury  8/24/2024 1001 by Susan Mcbride RN  Outcome: Progressing     Problem: Cardiovascular - Adult  Goal: Maintains optimal cardiac output and hemodynamic stability  8/24/2024 1001 by Susan Mcbride RN  Outcome: Progressing     Problem: Cardiovascular - Adult  Goal: Absence of cardiac dysrhythmias or at baseline  8/24/2024 1001 by Susan Mcbride RN  Outcome: Progressing        Problem: Pain  Goal: Verbalizes/displays adequate comfort level or baseline comfort level  8/24/2024 1001 by Susan Mcbride RN  Outcome: Progressing

## 2024-08-24 NOTE — PLAN OF CARE
Problem: Discharge Planning  Goal: Discharge to home or other facility with appropriate resources  8/24/2024 1511 by Susan Mcbride, RN  Outcome: Completed     Problem: Pain  Goal: Verbalizes/displays adequate comfort level or baseline comfort level  8/24/2024 1511 by Susan Mcbride RN  Outcome: Completed     Problem: Safety - Adult  Goal: Free from fall injury  8/24/2024 1511 by Susan Mcbride, RN  Outcome: Completed     Problem: Cardiovascular - Adult  Goal: Maintains optimal cardiac output and hemodynamic stability  8/24/2024 1511 by Susan Mcbride, RN  Outcome: Completed     Problem: Cardiovascular - Adult  Goal: Absence of cardiac dysrhythmias or at baseline  8/24/2024 1511 by Susan Mcbride, RN  Outcome: Completed

## 2024-08-24 NOTE — PROGRESS NOTES
Patient is stress lab.  Appreciate cardiology recommendation  Await stress test and MRI further recommendation to follow potential discharge today

## 2024-08-27 NOTE — DISCHARGE SUMMARY
[facility]    Patient Demographics    Patient. Jesus Marroquin  Date of Birth. 1970  MRN. [id number]     Primary care provider. Raymond Coello DO  (Tel: [phone])    Admit date: 8/23/2024    Discharge date (blank if same as Note Date): 8/24/2024  Note Date: 8/27/2024     Reason for Hospitalization.   Chief Complaint   Patient presents with    Numbness     Pt is a pt of dr simmons, known heart blockage and this week does not feel normal.  Has had pain in left bicep, numbness and tingling in his fingers and legs.  Has been on going all week.  Felt the tingling more today.             Problem-based Hospital Course.  Chest pain   Patient admitted for chest pain. Initial evaluation w as negative for any acute ischemia.  Underwent stress test which showed no acute ischemia  troponin and ekg negative  Pt was discharged stable     He underwent MRI cervical back showing some stenosis.  Outpatient neurosurgery    Consults.  IP CONSULT TO HOSPITALIST  IP CONSULT TO CARDIOLOGY    Physical examination on discharge day.   /81   Pulse 60   Temp 98.1 °F (36.7 °C) (Temporal)   Resp 14   Ht 1.727 m (5' 8\")   Wt 104.3 kg (230 lb)   SpO2 100%   BMI 34.97 kg/m²   General appearance.  Alert. Looks comfortable.  HEENT. Sclera clear. Moist mucus membranes.  Cardiovascular. Regular rate and rhythm, normal S1, S2. No murmur.   Respiratory. Not using accessory muscles.Clear to auscultation bilaterally, no wheeze.  Gastrointestinal. Abdomen soft, non-tender, not distended, normal bowel sounds  Neurology. Facial symmetry. No speech deficits. Moving all extremities equally.  Extremities. No edema in lower extremities.  Skin. Warm, dry, normal turgor    Condition at time of discharge stable     Medication instructions provided to patient at discharge.     Medication List        CHANGE how you

## 2024-10-08 ENCOUNTER — TELEPHONE (OUTPATIENT)
Dept: CARDIOLOGY CLINIC | Age: 54
End: 2024-10-08

## 2025-01-07 NOTE — PROGRESS NOTES
[facility] [place]  1/20/25  Referring: Raymond Coello DO    REASON FOR CONSULT/CHIEF COMPLAINT/HPI     Reason for visit/ Chief complaint  CAD, annual follow-up     HPI Jesus Marroquin is a 54 y.o.male here for regular follow up. He had an abnormal stress test, but he denied having any symptoms during the test. He had a Cardiac CTA with a calcium score of 1044, and showed calcified and non calcified plaque throughout his coronary arteries. He then went for a [facility] with Dr Adam on 6/3/22 PCI of OM2 with 3.5X28 Xience VICKEY (PD with 3.5 NC) FFR of RCA= 0.91= not significant.     Since last visit, he had a chest pain scare for which he went to the ED and was admitted.  Stress test was negative.  It was determined that his symptoms were due to a pinched nerve in his C-spine.    Today, he feels well.  He has no chest pain, shortness of breath, palpitations, dizziness, syncope, orthopnea, or PND.  Recent labs continue to show lipids at goal. A1C is creeping up and he is now in the prediabetic range.    Patient is adherent with medications and is tolerating them well without side effects     HISTORY/ALLERGIES/ROS     MedHx:  has a past medical history of CAD (coronary artery disease), Hyperlipidemia, Hypertension, and Parathyroid disease (HCC).  SurgHx:  has a past surgical history that includes Coronary stent placement.   SocHx:  reports that he has never smoked. He has never used smokeless tobacco. He reports that he does not currently use alcohol. He reports that he does not use drugs.   Allergies: Patient has no known allergies.     MEDICATIONS      Prior to Admission medications    Medication Sig Start Date End Date Taking? Authorizing Provider   Multiple Vitamins-Minerals (ONE-A-DAY MENS 50+ ADVANTAGE) TABS Take 1 tablet by mouth daily (with breakfast)   Yes Provider, MD Erinn   vitamin D (CHOLECALCIFEROL) 25 MCG (1000 UT) TABS tablet Take 1 tablet by mouth daily (with breakfast)   Yes Provider

## 2025-01-20 ENCOUNTER — OFFICE VISIT (OUTPATIENT)
Dept: CARDIOLOGY CLINIC | Age: 55
End: 2025-01-20
Payer: COMMERCIAL

## 2025-01-20 VITALS
DIASTOLIC BLOOD PRESSURE: 72 MMHG | WEIGHT: 230 LBS | OXYGEN SATURATION: 98 % | SYSTOLIC BLOOD PRESSURE: 122 MMHG | HEART RATE: 53 BPM | BODY MASS INDEX: 34.86 KG/M2 | HEIGHT: 68 IN

## 2025-01-20 DIAGNOSIS — R73.03 PREDIABETES: ICD-10-CM

## 2025-01-20 DIAGNOSIS — I25.10 CORONARY ARTERY DISEASE INVOLVING NATIVE CORONARY ARTERY OF NATIVE HEART, UNSPECIFIED WHETHER ANGINA PRESENT: Primary | ICD-10-CM

## 2025-01-20 DIAGNOSIS — R07.2 PRECORDIAL PAIN: ICD-10-CM

## 2025-01-20 DIAGNOSIS — E78.2 MIXED HYPERLIPIDEMIA: ICD-10-CM

## 2025-01-20 DIAGNOSIS — I10 BENIGN ESSENTIAL HTN: ICD-10-CM

## 2025-01-20 PROCEDURE — 3074F SYST BP LT 130 MM HG: CPT | Performed by: INTERNAL MEDICINE

## 2025-01-20 PROCEDURE — 3078F DIAST BP <80 MM HG: CPT | Performed by: INTERNAL MEDICINE

## 2025-01-20 PROCEDURE — 1036F TOBACCO NON-USER: CPT | Performed by: INTERNAL MEDICINE

## 2025-01-20 PROCEDURE — 3017F COLORECTAL CA SCREEN DOC REV: CPT | Performed by: INTERNAL MEDICINE

## 2025-01-20 PROCEDURE — 93000 ELECTROCARDIOGRAM COMPLETE: CPT | Performed by: INTERNAL MEDICINE

## 2025-01-20 PROCEDURE — 99214 OFFICE O/P EST MOD 30 MIN: CPT | Performed by: INTERNAL MEDICINE

## 2025-01-20 PROCEDURE — G8427 DOCREV CUR MEDS BY ELIG CLIN: HCPCS | Performed by: INTERNAL MEDICINE

## 2025-01-20 PROCEDURE — G8417 CALC BMI ABV UP PARAM F/U: HCPCS | Performed by: INTERNAL MEDICINE

## 2025-01-20 NOTE — PATIENT INSTRUCTIONS
Follow up with Dr Gonzalez in     [place] to shovel snow.     Call for any questions or concerns.

## 2025-01-21 ENCOUNTER — TELEPHONE (OUTPATIENT)
Dept: CARDIOLOGY CLINIC | Age: 55
End: 2025-01-21

## 2025-01-21 NOTE — TELEPHONE ENCOUNTER
The patient phoned stating that he has a cold/fever and he would like to know if he is able to take dayquil/nightquil for his symptoms.  He has only been taking Tylenol.  Please advise. Thank you

## 2025-01-22 NOTE — TELEPHONE ENCOUNTER
Spoke to Pt and relayed message per JPRN regarding speaking to pharmacist and not taking D(decongestant medications). Pt verbalized understanding.

## 2025-01-22 NOTE — TELEPHONE ENCOUNTER
Pharmacy is great resource for medication questions. Typically they should stay away from anything with D (Decongestant) when they have HTN. Pharmacist can advise per his med list what is good for him based on his symptoms. Please call to advise pt.

## 2025-01-22 NOTE — TELEPHONE ENCOUNTER
Spoke with patient regarding message from MENDEZ Schwartz.  Verbalized understanding.      Patient is asking what medication he can take with his BB for cold-like symptoms?

## 2025-01-30 RX ORDER — CARVEDILOL 12.5 MG/1
12.5 TABLET ORAL 2 TIMES DAILY WITH MEALS
Qty: 180 TABLET | Refills: 3 | Status: SHIPPED | OUTPATIENT
Start: 2025-01-30

## 2025-01-30 NOTE — TELEPHONE ENCOUNTER
Received ref[facility] for Coreg 12.5 MG  [facility]     Last OV: 01/20/25    Next OV: none    Last EKG 01/20/25    Last Filled: 03/19/24

## 2025-02-17 NOTE — TELEPHONE ENCOUNTER
Received refill request for Coreg from [facility] pharmacy.     Last OV: 1/20/2025 [facility]    Next OV:     Last Labs: 1/20/2025 EKG    All other medications have been previously sent to [facility] with refills remaining.

## 2025-02-17 NOTE — TELEPHONE ENCOUNTER
Lmom for pt to call back to clarify which pharmacy he would like to receive Carvedilol 12.5mg. [facility] has faxed a refill request for Carvedilol and has 3 refills to [facility].

## 2025-02-17 NOTE — TELEPHONE ENCOUNTER
Medication Refill    Medication needing refilled:  carvedilol   aspirin   rosuvastatin   losartan     Dosage of the medication:   12.5mg  81mg  40mg  100mg    How are you taking this medication (QD, BID, TID, QID, PRN):   Take 1 tablet by mouth 2 times daily (with meals)   Take 1 tablet by mouth daily   Take 1 tablet by mouth daily   Take 1 tablet by mouth daily    30 or 90 day supply called in:   180  90  90  90    When will you run out of your medication:    Which Pharmacy are we sending the medication to?:     [facility]  [address] [place] [id number]  Phone: [phone]  Fax: [phone]

## 2025-02-18 RX ORDER — CARVEDILOL 12.5 MG/1
12.5 TABLET ORAL 2 TIMES DAILY WITH MEALS
Qty: 180 TABLET | Refills: 3 | Status: SHIPPED | OUTPATIENT
Start: 2025-02-18 | End: 2025-02-20 | Stop reason: SDUPTHER

## 2025-02-20 RX ORDER — LOSARTAN POTASSIUM 100 MG/1
100 TABLET ORAL DAILY
Qty: 90 TABLET | Refills: 3 | Status: SHIPPED | OUTPATIENT
Start: 2025-02-20

## 2025-02-20 RX ORDER — CARVEDILOL 12.5 MG/1
12.5 TABLET ORAL 2 TIMES DAILY WITH MEALS
Qty: 180 TABLET | Refills: 3 | Status: SHIPPED | OUTPATIENT
Start: 2025-02-20

## 2025-02-20 RX ORDER — ROSUVASTATIN CALCIUM 40 MG/1
40 TABLET, COATED ORAL DAILY
Qty: 90 TABLET | Refills: 3 | Status: SHIPPED | OUTPATIENT
Start: 2025-02-20

## 2025-02-20 RX ORDER — ASPIRIN 81 MG/1
81 TABLET, CHEWABLE ORAL DAILY
Qty: 90 TABLET | Refills: 3 | Status: SHIPPED | OUTPATIENT
Start: 2025-02-20

## 2025-02-20 NOTE — TELEPHONE ENCOUNTER
Verified last OV note and labs in care everywhere. Refills sent to [facility] as requested. On recall list for OV w/ [facility] 1/2026   5